# Patient Record
Sex: FEMALE | Race: BLACK OR AFRICAN AMERICAN | NOT HISPANIC OR LATINO | Employment: UNEMPLOYED | URBAN - METROPOLITAN AREA
[De-identification: names, ages, dates, MRNs, and addresses within clinical notes are randomized per-mention and may not be internally consistent; named-entity substitution may affect disease eponyms.]

---

## 2017-01-09 ENCOUNTER — TELEPHONE (OUTPATIENT)
Dept: UROLOGY | Facility: CLINIC | Age: 57
End: 2017-01-09

## 2017-01-09 NOTE — TELEPHONE ENCOUNTER
----- Message from Jenny Lujan sent at 1/9/2017 11:17 AM CST -----  Contact: Flaquita Lawrence# [phone]  Flaquita Chun states she was calling to speak to the nurse about a Code the pt need for her injection 1/13.

## 2017-01-10 ENCOUNTER — TELEPHONE (OUTPATIENT)
Dept: OBSTETRICS AND GYNECOLOGY | Facility: CLINIC | Age: 57
End: 2017-01-10

## 2017-01-10 NOTE — TELEPHONE ENCOUNTER
Spoke with patient and advised her to speak with someone in billing for prices. Also was told to inquire about code. Patient has no further questions or complaints.

## 2017-01-10 NOTE — TELEPHONE ENCOUNTER
----- Message from Ramona Levine sent at 1/10/2017 11:44 AM CST -----  Contact: Patient  X _1st Request  _  2nd Request  _  3rd Request    Who:Meaghan Call [[id number]]    Why:Patient states she needs the cpt code and price for the injection for the estrogen    What Number to Call Back:Patient can be reached at [phone]    When to Expect a call back: (Before the end of the day)   -- if call after 3:00 call back will be tomorrow.

## 2017-01-13 ENCOUNTER — OFFICE VISIT (OUTPATIENT)
Dept: OBSTETRICS AND GYNECOLOGY | Facility: CLINIC | Age: 57
End: 2017-01-13
Attending: OBSTETRICS & GYNECOLOGY
Payer: COMMERCIAL

## 2017-01-13 VITALS
DIASTOLIC BLOOD PRESSURE: 72 MMHG | HEIGHT: 61 IN | BODY MASS INDEX: 21.31 KG/M2 | SYSTOLIC BLOOD PRESSURE: 106 MMHG | WEIGHT: 112.88 LBS

## 2017-01-13 DIAGNOSIS — Z78.0 MENOPAUSE: Primary | ICD-10-CM

## 2017-01-13 PROCEDURE — 96372 THER/PROPH/DIAG INJ SC/IM: CPT | Mod: S$GLB,,, | Performed by: OBSTETRICS & GYNECOLOGY

## 2017-01-13 PROCEDURE — 99214 OFFICE O/P EST MOD 30 MIN: CPT | Mod: 25,S$GLB,, | Performed by: OBSTETRICS & GYNECOLOGY

## 2017-01-13 PROCEDURE — 3078F DIAST BP <80 MM HG: CPT | Mod: S$GLB,,, | Performed by: OBSTETRICS & GYNECOLOGY

## 2017-01-13 PROCEDURE — 3074F SYST BP LT 130 MM HG: CPT | Mod: S$GLB,,, | Performed by: OBSTETRICS & GYNECOLOGY

## 2017-01-13 PROCEDURE — 1159F MED LIST DOCD IN RCRD: CPT | Mod: S$GLB,,, | Performed by: OBSTETRICS & GYNECOLOGY

## 2017-01-13 PROCEDURE — 99999 PR PBB SHADOW E&M-EST. PATIENT-LVL II: CPT | Mod: PBBFAC,,, | Performed by: OBSTETRICS & GYNECOLOGY

## 2017-01-13 NOTE — PROGRESS NOTES
Subjective:       Patient ID: Meaghan Call is a 56 y.o. female.    Chief Complaint:  Menopause      History of Present Illness  HPI  This 56 yr old female with history of Hyst /BSO in June in [place] is here to follow up after starting on injections of estradiol 10 mg Im.  She was very upset last time I saw her about her symptoms of menopause.  She felt better once she received the injection almost immediately.  She is not ready to start on the testosterone. We discussed menopause again and will repeat injection  Follow up with labs in 2 weeks    GYN & OB History  No LMP recorded. Patient has had a hysterectomy.   Date of Last Pap: No result found    OB History   No data available       Review of Systems  Review of Systems        Objective:    Physical Exam       Assessment:        1. Menopause               Plan:      Repeat injection of E 10mg and do not use vagifem or pills.    Lab for estrogen in 2 weeks.

## 2017-01-27 ENCOUNTER — LAB VISIT (OUTPATIENT)
Dept: LAB | Facility: OTHER | Age: 57
End: 2017-01-27
Attending: OBSTETRICS & GYNECOLOGY
Payer: COMMERCIAL

## 2017-01-27 DIAGNOSIS — Z78.0 MENOPAUSE: ICD-10-CM

## 2017-01-27 LAB — ESTRADIOL SERPL-MCNC: 268 PG/ML

## 2017-01-27 PROCEDURE — 36415 COLL VENOUS BLD VENIPUNCTURE: CPT

## 2017-01-27 PROCEDURE — 82670 ASSAY OF TOTAL ESTRADIOL: CPT

## 2017-01-30 ENCOUNTER — PATIENT MESSAGE (OUTPATIENT)
Dept: OBSTETRICS AND GYNECOLOGY | Facility: CLINIC | Age: 57
End: 2017-01-30

## 2017-02-07 ENCOUNTER — PATIENT MESSAGE (OUTPATIENT)
Dept: OBSTETRICS AND GYNECOLOGY | Facility: CLINIC | Age: 57
End: 2017-02-07

## 2017-02-20 ENCOUNTER — CLINICAL SUPPORT (OUTPATIENT)
Dept: OBSTETRICS AND GYNECOLOGY | Facility: CLINIC | Age: 57
End: 2017-02-20
Payer: COMMERCIAL

## 2017-02-20 DIAGNOSIS — Z78.0 MENOPAUSE: Primary | ICD-10-CM

## 2017-02-20 PROCEDURE — 96372 THER/PROPH/DIAG INJ SC/IM: CPT | Mod: S$GLB,,, | Performed by: OBSTETRICS & GYNECOLOGY

## 2017-02-20 PROCEDURE — 99999 PR PBB SHADOW E&M-EST. PATIENT-LVL II: CPT | Mod: PBBFAC,,,

## 2017-02-20 NOTE — PROGRESS NOTES
Here for  hormone therapy injection, no complaints at this time , Injection given as ordered, tolerated well, no report of pain prior to or after injection. Return to clinic as scheduled.     Depo Estradiol  10 mg    Clinic Supplied Medication

## 2017-03-19 ENCOUNTER — PATIENT MESSAGE (OUTPATIENT)
Dept: OBSTETRICS AND GYNECOLOGY | Facility: CLINIC | Age: 57
End: 2017-03-19

## 2017-03-22 ENCOUNTER — TELEPHONE (OUTPATIENT)
Dept: OBSTETRICS AND GYNECOLOGY | Facility: CLINIC | Age: 57
End: 2017-03-22

## 2017-03-22 ENCOUNTER — CLINICAL SUPPORT (OUTPATIENT)
Dept: OBSTETRICS AND GYNECOLOGY | Facility: CLINIC | Age: 57
End: 2017-03-22
Payer: COMMERCIAL

## 2017-03-22 PROCEDURE — 99999 PR PBB SHADOW E&M-EST. PATIENT-LVL II: CPT | Mod: PBBFAC,,,

## 2017-03-22 PROCEDURE — 96372 THER/PROPH/DIAG INJ SC/IM: CPT | Mod: S$GLB,,, | Performed by: OBSTETRICS & GYNECOLOGY

## 2017-03-22 NOTE — PROGRESS NOTES
Here for  hormone therapy injection, no complaints at this time , Injection given as ordered, tolerated well, no report of pain prior to or after injection. Return to clinic as scheduled.     Site - [facility]    Depo Estradiol     10    mg    Clinic Supplied Medication

## 2017-03-22 NOTE — TELEPHONE ENCOUNTER
----- Message from Mercedes Eli MD sent at 3/21/2017  1:12 PM CDT -----  Sandra, please call this pt for appointment with me for hormone follow up.  She had appointment for her injection next week.

## 2017-03-30 ENCOUNTER — TELEPHONE (OUTPATIENT)
Dept: OBSTETRICS AND GYNECOLOGY | Facility: CLINIC | Age: 57
End: 2017-03-30

## 2017-03-30 NOTE — TELEPHONE ENCOUNTER
----- Message from Millie Rosenberg sent at 3/30/2017  9:28 AM CDT -----  Contact: self  Patient is wanting a diagnosis code for the pellets. Patient can be reached at [phone].

## 2017-03-30 NOTE — TELEPHONE ENCOUNTER
Patient was provided with CPT for pellet insertion and that the cost may vary depending on her insurance coverage,patient was informed that typically everyone insurance of verified prior to the appointment.

## 2017-03-30 NOTE — TELEPHONE ENCOUNTER
----- Message from Erwin Abernathy sent at 3/30/2017  9:10 AM CDT -----  Please call pt she needs the price on a in office procedure [phone]

## 2017-04-03 ENCOUNTER — TELEPHONE (OUTPATIENT)
Dept: OBSTETRICS AND GYNECOLOGY | Facility: CLINIC | Age: 57
End: 2017-04-03

## 2017-04-03 NOTE — TELEPHONE ENCOUNTER
----- Message from Sandra Santiago sent at 4/3/2017 10:28 AM CDT -----  Contact: pt  x_  1st Request  _  2nd Request  _  3rd Request      Who:pt    Why: pt would like to reschedule her appointment to April 19 if possible     What Number to Call Back: [phone]    When to Expect a call back: (Before the end of the day)   -- if call after 3:00 call back will be tomorrow.

## 2017-04-17 ENCOUNTER — TELEPHONE (OUTPATIENT)
Dept: OBSTETRICS AND GYNECOLOGY | Facility: CLINIC | Age: 57
End: 2017-04-17

## 2017-04-17 NOTE — TELEPHONE ENCOUNTER
----- Message from Maria D Villarreal sent at 4/17/2017 12:20 PM CDT -----  Contact: self  Pt needing a call back regarding an appt, pt can be reached at [phone].

## 2017-04-17 NOTE — TELEPHONE ENCOUNTER
Tried to schedule the patient and unable to select. Patient advised I would leave the message for review tomorrow

## 2017-04-21 ENCOUNTER — CLINICAL SUPPORT (OUTPATIENT)
Dept: OBSTETRICS AND GYNECOLOGY | Facility: CLINIC | Age: 57
End: 2017-04-21
Payer: COMMERCIAL

## 2017-04-21 ENCOUNTER — OFFICE VISIT (OUTPATIENT)
Dept: OBSTETRICS AND GYNECOLOGY | Facility: CLINIC | Age: 57
End: 2017-04-21
Attending: OBSTETRICS & GYNECOLOGY
Payer: COMMERCIAL

## 2017-04-21 VITALS
SYSTOLIC BLOOD PRESSURE: 112 MMHG | WEIGHT: 120.56 LBS | DIASTOLIC BLOOD PRESSURE: 84 MMHG | BODY MASS INDEX: 22.76 KG/M2 | HEIGHT: 61 IN

## 2017-04-21 DIAGNOSIS — Z79.890 POSTMENOPAUSAL HRT (HORMONE REPLACEMENT THERAPY): Primary | ICD-10-CM

## 2017-04-21 PROCEDURE — 99999 PR PBB SHADOW E&M-EST. PATIENT-LVL II: CPT | Mod: PBBFAC,,,

## 2017-04-21 PROCEDURE — 3074F SYST BP LT 130 MM HG: CPT | Mod: S$GLB,,, | Performed by: OBSTETRICS & GYNECOLOGY

## 2017-04-21 PROCEDURE — 3079F DIAST BP 80-89 MM HG: CPT | Mod: S$GLB,,, | Performed by: OBSTETRICS & GYNECOLOGY

## 2017-04-21 PROCEDURE — 99213 OFFICE O/P EST LOW 20 MIN: CPT | Mod: 25,S$GLB,, | Performed by: OBSTETRICS & GYNECOLOGY

## 2017-04-21 PROCEDURE — 99999 PR PBB SHADOW E&M-EST. PATIENT-LVL II: CPT | Mod: PBBFAC,,, | Performed by: OBSTETRICS & GYNECOLOGY

## 2017-04-21 PROCEDURE — 1160F RVW MEDS BY RX/DR IN RCRD: CPT | Mod: S$GLB,,, | Performed by: OBSTETRICS & GYNECOLOGY

## 2017-04-21 PROCEDURE — 96372 THER/PROPH/DIAG INJ SC/IM: CPT | Mod: S$GLB,,, | Performed by: OBSTETRICS & GYNECOLOGY

## 2017-04-21 RX ORDER — PROMETHAZINE HYDROCHLORIDE 25 MG/1
25 TABLET ORAL EVERY 6 HOURS PRN
Refills: 1 | COMMUNITY
Start: 2017-04-02 | End: 2017-10-09

## 2017-04-21 NOTE — PROGRESS NOTES
Subjective:       Patient ID: Meaghan Call is a 56 y.o. female.    Chief Complaint:  Follow-up      History of Present Illness  HPI  This 56 yr old female with history of hyst/BSO is finally doing well on monthly injections of 10 mg estradiol.  She states that a few days before each injection, she starts to feel badly again.  We discussed pellets and will send a letter to her insurance co for coverage.  She does not desire to take testosterone.  No other issues and she is due for her injection today.    GYN & OB History  No LMP recorded. Patient has had a hysterectomy.   Date of Last Pap: 3/14/2017    OB History   No data available       Review of Systems  Review of Systems   Constitutional: Negative for chills and fever.   Respiratory: Negative for shortness of breath.    Cardiovascular: Negative for chest pain.   Gastrointestinal: Negative for abdominal pain, nausea and vomiting.   Genitourinary: Negative for difficulty urinating, dyspareunia, genital sores, menstrual problem, pelvic pain, vaginal bleeding, vaginal discharge and vaginal pain.   Skin: Negative for wound.   Hematological: Negative for adenopathy.           Objective:    Physical Exam       Assessment:        1. Postmenopausal HRT (hormone replacement therapy)               Plan:      Continue estrogen Im 10 mg monthly and may try pellets if her insurance will help cover.

## 2017-04-21 NOTE — LETTER
April 21, 2017    Meaghan Clal  [address]  [place] [address]             [facility] [address]  [address] [facility] [address]  Phone: [phone] To whom it may concern,    To whom it may concern,    Ms Meaghan Call is a patient of mine and she has menopausal symptoms that affect her daily life.  She has tried numerous treatments with no relief and is finally doing well with the insertion of hormone long term pellets.  She has tried estrace, vivelle, climara  with no relief.  This problem affects her sleep, level of energy, mood, libido and relationships with family and friends and also her work.   She is doing very well on the hormone pellets and this is not an optional medication for her.  Please review her case and cover this necessary medication for my patient.  The code for this procedure is 64263  Thank you for your time and attention to this matter.          Mercedes lEi MD  Gynecologist  [facility]  [address]  [address]      If you have any questions or concerns, please don't hesitate to call.    Sincerely,        Mercedes Eli MD

## 2017-04-21 NOTE — PROGRESS NOTES
Here for  hormone therapy injection, no complaints at this time , Injection given as ordered, tolerated well, no report of pain prior to or after injection. Return to clinic as scheduled.     Site - [facility]    Depo Estradiol  10 mg    Clinic Supplied Medication

## 2017-05-18 ENCOUNTER — CLINICAL SUPPORT (OUTPATIENT)
Dept: OBSTETRICS AND GYNECOLOGY | Facility: CLINIC | Age: 57
End: 2017-05-18
Payer: COMMERCIAL

## 2017-05-18 DIAGNOSIS — Z79.890 POSTMENOPAUSAL HRT (HORMONE REPLACEMENT THERAPY): Primary | ICD-10-CM

## 2017-05-18 PROCEDURE — 99999 PR PBB SHADOW E&M-EST. PATIENT-LVL II: CPT | Mod: PBBFAC,,,

## 2017-05-18 PROCEDURE — 96372 THER/PROPH/DIAG INJ SC/IM: CPT | Mod: S$GLB,,, | Performed by: OBSTETRICS & GYNECOLOGY

## 2017-05-22 ENCOUNTER — PATIENT MESSAGE (OUTPATIENT)
Dept: OBSTETRICS AND GYNECOLOGY | Facility: CLINIC | Age: 57
End: 2017-05-22

## 2017-06-09 ENCOUNTER — OFFICE VISIT (OUTPATIENT)
Dept: UROLOGY | Facility: CLINIC | Age: 57
End: 2017-06-09
Payer: COMMERCIAL

## 2017-06-09 VITALS
HEART RATE: 86 BPM | DIASTOLIC BLOOD PRESSURE: 85 MMHG | BODY MASS INDEX: 23.06 KG/M2 | WEIGHT: 122.13 LBS | HEIGHT: 61 IN | SYSTOLIC BLOOD PRESSURE: 137 MMHG

## 2017-06-09 DIAGNOSIS — G89.29 CHRONIC BILATERAL LOW BACK PAIN WITHOUT SCIATICA: ICD-10-CM

## 2017-06-09 DIAGNOSIS — M54.50 CHRONIC BILATERAL LOW BACK PAIN WITHOUT SCIATICA: ICD-10-CM

## 2017-06-09 DIAGNOSIS — N30.10 INTERSTITIAL CYSTITIS: Primary | ICD-10-CM

## 2017-06-09 PROCEDURE — 51700 IRRIGATION OF BLADDER: CPT | Mod: S$GLB,,, | Performed by: UROLOGY

## 2017-06-09 PROCEDURE — 99999 PR PBB SHADOW E&M-EST. PATIENT-LVL III: CPT | Mod: PBBFAC,,, | Performed by: UROLOGY

## 2017-06-09 PROCEDURE — 99214 OFFICE O/P EST MOD 30 MIN: CPT | Mod: 25,S$GLB,, | Performed by: UROLOGY

## 2017-06-09 RX ORDER — LIDOCAINE HYDROCHLORIDE 10 MG/ML
20 INJECTION INFILTRATION; PERINEURAL
Status: COMPLETED | OUTPATIENT
Start: 2017-06-09 | End: 2017-06-09

## 2017-06-09 RX ORDER — BUPIVACAINE HYDROCHLORIDE 5 MG/ML
20 INJECTION, SOLUTION EPIDURAL; INTRACAUDAL
Status: COMPLETED | OUTPATIENT
Start: 2017-06-09 | End: 2017-06-09

## 2017-06-09 RX ADMIN — BUPIVACAINE HYDROCHLORIDE 100 MG: 5 INJECTION, SOLUTION EPIDURAL; INTRACAUDAL at 10:06

## 2017-06-09 RX ADMIN — LIDOCAINE HYDROCHLORIDE 20 ML: 10 INJECTION INFILTRATION; PERINEURAL at 10:06

## 2017-06-09 NOTE — PROGRESS NOTES
CHIEF COMPLAINT:    Mrs. Call is a 56 y.o. female presenting for a follow up on interstitial cystitis    PRESENTING ILLNESS:    Meaghan Call is a 56 y.o. female  Who returns for follow up.  She states for the past several weeks, she has had lower back pain.  She does not move very much secondary to fibromyalgia.  She states she also has pain that radiates towards the front suprapubically.  She is unsure if this is deep in the pelvis or if it is at the level of the bladder.   Review of the chart showed that she has done really well with the hormone therapy prescribed by Dr. Eli.    REVIEW OF SYSTEMS:    Review of Systems   Constitutional: Negative.    HENT: Negative.    Eyes: Negative.    Respiratory: Negative.    Cardiovascular: Negative.    Gastrointestinal: Positive for abdominal pain.   Genitourinary: Negative.    Musculoskeletal: Positive for myalgias.   Skin: Negative.    Neurological: Negative.    Endo/Heme/Allergies: Negative.    Psychiatric/Behavioral: Negative.      PATIENT HISTORY:    Past Medical History:   Diagnosis Date    Acid reflux     Gastroparesis     Hypertension     Interstitial cystitis 2004    Interstitial cystitis     Kidney stone     Osteopenia        Past Surgical History:   Procedure Laterality Date    BREAST BIOPSY Left     BENIGN    CHOLECYSTECTOMY      CYSTOSCOPY      HYSTERECTOMY      MUSCLE BIPOSY Right     Thigh area    OOPHORECTOMY      TUBAL LIGATION         No family history on file.    Social History    Marital status:      Social History Main Topics    Smoking status: Never Smoker    Smokeless tobacco: Not on file    Alcohol use No    Drug use: No    Sexual activity: Yes       Allergies:  Codeine    Medications:  Outpatient Encounter Prescriptions as of 6/9/2017   Medication Sig Dispense Refill    estradiol (ESTRACE) 2 MG tablet Take 2 mg by mouth once daily.  6    famotidine (PEPCID) 40 MG tablet Take 40 mg by mouth once daily.       propranolol (INDERAL) 10 MG tablet Take 10 mg by mouth 3 (three) times daily.      URO--10-40.8-36 mg Cap       VAGIFEM 10 mcg Tab Place 10 mg vaginally.  2    promethazine (PHENERGAN) 25 MG tablet Take 25 mg by mouth every 6 (six) hours as needed.  1    [DISCONTINUED] alprazolam (XANAX) 0.25 MG tablet       [DISCONTINUED] fluconazole (DIFLUCAN) 100 MG tablet Take 100 mg by mouth every 7 days.      [DISCONTINUED] OSPHENA 60 mg Tab Take 1 tablet by mouth once daily.  5     Facility-Administered Encounter Medications as of 6/9/2017   Medication Dose Route Frequency Provider Last Rate Last Dose    bupivacaine (PF) 0.5% (5 mg/ml) injection 100 mg  20 mL Intravesical 1 time in Clinic/HOD Ebonie Booker MD        estradiol cypionate 5 mg/mL injection 10 mg  10 mg Intramuscular Q28 Days Mercedes Eli MD   10 mg at 05/18/17 1005    hydrocortisone sodium succinate injection 100 mg  100 mg Intravesical 1 time in Clinic/HOD Ebonie Booker MD        lidocaine HCL 10 mg/ml (1%) injection 20 mL  20 mL Intravesical 1 time in Clinic/HOD Ebonie Booker MD             PHYSICAL EXAMINATION:    The patient generally appears in good health, is appropriately interactive, and is in no apparent distress.    Skin: No lesions.    Mental: Cooperative with normal affect.    Neuro: Grossly intact.    HEENT: Normal. No evidence of lymphadenopathy.    Chest:  normal inspiratory effort.    Abdomen:  Soft, non-tender. No masses or organomegaly. Bladder is not palpable. No evidence of flank discomfort. No evidence of inguinal hernia.    Extremities: No clubbing, cyanosis, or edema    Normal external female genitalia  Urethral meatus is normal  Urethra and bladder are nontender to bimanual exam  Well supported anteriorly and posteriorly   Uterus and cervix are surgically absent  No adnexal masses  PVR by catheterization was 30 ml    Back:  She indicates the lumbar spine as the area of pain.      LABS:    UA 1.015,  pH 6, tr blood, otherwise, negative    IMPRESSION:    Encounter Diagnoses   Name Primary?    Interstitial cystitis Yes    Chronic bilateral low back pain without sciatica        PLAN:    1.  Discussed that he lower back amy is likely secondary to chronic lower back pain.    2.  The bladder was instilled with 1% lidocaine and 0.25% Marcaine and solu cortef.  She felt relief of her symptoms of the lower abdominal pain.    3.  Suggested that she do yoga or pilates for the back pain to support core strength.  Will also help with her myalgias.   4.  Follow up as needed.

## 2017-06-14 ENCOUNTER — CLINICAL SUPPORT (OUTPATIENT)
Dept: OBSTETRICS AND GYNECOLOGY | Facility: CLINIC | Age: 57
End: 2017-06-14
Payer: COMMERCIAL

## 2017-06-14 PROCEDURE — 96372 THER/PROPH/DIAG INJ SC/IM: CPT | Mod: S$GLB,,, | Performed by: OBSTETRICS & GYNECOLOGY

## 2017-06-14 PROCEDURE — 99999 PR PBB SHADOW E&M-EST. PATIENT-LVL II: CPT | Mod: PBBFAC,,,

## 2017-07-12 ENCOUNTER — CLINICAL SUPPORT (OUTPATIENT)
Dept: OBSTETRICS AND GYNECOLOGY | Facility: CLINIC | Age: 57
End: 2017-07-12
Payer: COMMERCIAL

## 2017-07-12 DIAGNOSIS — Z79.890 HORMONE REPLACEMENT THERAPY (HRT): Primary | ICD-10-CM

## 2017-07-12 PROCEDURE — 96372 THER/PROPH/DIAG INJ SC/IM: CPT | Mod: S$GLB,,, | Performed by: OBSTETRICS & GYNECOLOGY

## 2017-07-12 PROCEDURE — 99999 PR PBB SHADOW E&M-EST. PATIENT-LVL II: CPT | Mod: PBBFAC,,,

## 2017-07-18 DIAGNOSIS — N95.1 SYMPTOMATIC MENOPAUSAL OR FEMALE CLIMACTERIC STATES: Primary | ICD-10-CM

## 2017-07-18 NOTE — PATIENT INSTRUCTIONS
Follow the information you have been given by your doctor. Here are some additional instructions and reminders on how to care for yourself once you're home.     Keep pressure on the area for 1 hour. You may have some discomfort. This will go away.   A slight bloody discharge is normal.   A slight yellow discharge of serum is normal.   Pellet can be felt below the skin cut. This is normal.   Breast and vulva tingling and feeling of fullness is normal.   Change your bandage daily for 5 to 7 days.   If small butterfly tape comes off, you do not need to do anything.   You may remove butterfly tape after 5 days.   You may shower the next morning after your procedure. Do NOT take a bath.   Change your bandage if it becomes wet.   If one small white pellet comes out, just wipe off with alcohol and throw away.   If area becomes tender and inflamed, take the antibiotic medication you have been given.   If the suture looks like it has melted, schedule an appointment to have it removed.   Do not do exercise or stretch for 3 days.   Some fluid retention can occur in first weeks or later.   In about 1 month, this area will feel like a lump. This is normal.    If you have any questions, contact the [facility] at [phone].    OCTAVIO Eli MD  [facility]  [address] [facility], [address]  Phone: [phone]  Fax: [phone]

## 2017-08-11 ENCOUNTER — CLINICAL SUPPORT (OUTPATIENT)
Dept: OBSTETRICS AND GYNECOLOGY | Facility: CLINIC | Age: 57
End: 2017-08-11
Payer: COMMERCIAL

## 2017-08-11 DIAGNOSIS — Z79.890 HORMONE REPLACEMENT THERAPY (HRT): Primary | ICD-10-CM

## 2017-08-11 PROCEDURE — 99999 PR PBB SHADOW E&M-EST. PATIENT-LVL II: CPT | Mod: PBBFAC,,,

## 2017-08-11 PROCEDURE — 96372 THER/PROPH/DIAG INJ SC/IM: CPT | Mod: S$GLB,,, | Performed by: OBSTETRICS & GYNECOLOGY

## 2017-09-08 ENCOUNTER — CLINICAL SUPPORT (OUTPATIENT)
Dept: OBSTETRICS AND GYNECOLOGY | Facility: CLINIC | Age: 57
End: 2017-09-08
Payer: COMMERCIAL

## 2017-09-08 PROCEDURE — 96372 THER/PROPH/DIAG INJ SC/IM: CPT | Mod: S$GLB,,, | Performed by: OBSTETRICS & GYNECOLOGY

## 2017-09-08 PROCEDURE — 99999 PR PBB SHADOW E&M-EST. PATIENT-LVL II: CPT | Mod: PBBFAC,,,

## 2017-10-09 ENCOUNTER — OFFICE VISIT (OUTPATIENT)
Dept: UROLOGY | Facility: CLINIC | Age: 57
End: 2017-10-09
Payer: COMMERCIAL

## 2017-10-09 ENCOUNTER — CLINICAL SUPPORT (OUTPATIENT)
Dept: OBSTETRICS AND GYNECOLOGY | Facility: CLINIC | Age: 57
End: 2017-10-09
Payer: COMMERCIAL

## 2017-10-09 ENCOUNTER — TELEPHONE (OUTPATIENT)
Dept: UROLOGY | Facility: CLINIC | Age: 57
End: 2017-10-09

## 2017-10-09 VITALS
HEIGHT: 61 IN | WEIGHT: 121.5 LBS | SYSTOLIC BLOOD PRESSURE: 139 MMHG | BODY MASS INDEX: 22.94 KG/M2 | HEART RATE: 86 BPM | DIASTOLIC BLOOD PRESSURE: 76 MMHG

## 2017-10-09 DIAGNOSIS — R31.29 MICROSCOPIC HEMATURIA: ICD-10-CM

## 2017-10-09 DIAGNOSIS — N39.3 URINARY, INCONTINENCE, STRESS FEMALE: Primary | ICD-10-CM

## 2017-10-09 DIAGNOSIS — R31.29 MICROSCOPIC HEMATURIA: Primary | ICD-10-CM

## 2017-10-09 DIAGNOSIS — N30.10 INTERSTITIAL CYSTITIS: ICD-10-CM

## 2017-10-09 PROCEDURE — 99999 PR PBB SHADOW E&M-EST. PATIENT-LVL II: CPT | Mod: PBBFAC,,,

## 2017-10-09 PROCEDURE — 99214 OFFICE O/P EST MOD 30 MIN: CPT | Mod: 25,S$GLB,, | Performed by: UROLOGY

## 2017-10-09 PROCEDURE — 81002 URINALYSIS NONAUTO W/O SCOPE: CPT | Mod: S$GLB,,, | Performed by: UROLOGY

## 2017-10-09 PROCEDURE — 96372 THER/PROPH/DIAG INJ SC/IM: CPT | Mod: S$GLB,,, | Performed by: OBSTETRICS & GYNECOLOGY

## 2017-10-09 PROCEDURE — 99999 PR PBB SHADOW E&M-EST. PATIENT-LVL IV: CPT | Mod: PBBFAC,,, | Performed by: UROLOGY

## 2017-10-09 RX ORDER — SUCRALFATE 1 G/1
TABLET ORAL
COMMUNITY
Start: 2017-08-05 | End: 2018-12-10

## 2017-10-09 NOTE — PATIENT INSTRUCTIONS
Ramirez Meza physical therapist  [facility], [address]    Phone [phone]

## 2017-10-09 NOTE — PROGRESS NOTES
CHIEF COMPLAINT:    Mrs. Call is a 57 y.o. female presenting for an evaluation of stress incontinence in a patient with history of interstitial cystitis.    PRESENTING ILLNESS:    Meaghan Call is a 57 y.o. female who returns for evaluation of stress urinary incontinence.  She states that she has stress urinary incontinence, (sneeze, cough, laughing.)  She now wears a panty liner when she goes out.  She will urinate more often, just in case.  She denies any urge incontinence.  Once she noted some dripping when walking away from the toilet.      REVIEW OF SYSTEMS:    Review of Systems   Constitutional: Negative.    HENT: Negative.    Eyes: Negative.    Respiratory: Negative.    Cardiovascular: Negative.    Gastrointestinal: Negative.    Genitourinary:        Stress incontinence   Musculoskeletal: Positive for myalgias.   Skin: Negative.    Neurological: Negative.    Endo/Heme/Allergies: Negative.    Psychiatric/Behavioral: Negative.      PATIENT HISTORY:    Past Medical History:   Diagnosis Date    Acid reflux     Gastroparesis     Hypertension     Interstitial cystitis 2004    Interstitial cystitis     Kidney stone     Osteopenia        Past Surgical History:   Procedure Laterality Date    BREAST BIOPSY Left     BENIGN    CHOLECYSTECTOMY      CYSTOSCOPY      HYSTERECTOMY      MUSCLE BIPOSY Right     Thigh area    OOPHORECTOMY      TUBAL LIGATION         No family history on file.    Social History    Marital status:      Social History Main Topics    Smoking status: Never Smoker    Smokeless tobacco: Never Used    Alcohol use No    Drug use: No    Sexual activity: Yes       Allergies:  Codeine    Medications:  Outpatient Encounter Prescriptions as of 10/9/2017   Medication Sig Dispense Refill    estradiol (ESTRACE) 2 MG tablet Take 2 mg by mouth once daily.  6    famotidine (PEPCID) 40 MG tablet Take 40 mg by mouth once daily.      propranolol (INDERAL) 10 MG tablet Take  10 mg by mouth 3 (three) times daily.      sucralfate (CARAFATE) 1 gram tablet       URO--10-40.8-36 mg Cap       VAGIFEM 10 mcg Tab Place 10 mg vaginally.  2    [DISCONTINUED] promethazine (PHENERGAN) 25 MG tablet Take 25 mg by mouth every 6 (six) hours as needed.  1     Facility-Administered Encounter Medications as of 10/9/2017   Medication Dose Route Frequency Provider Last Rate Last Dose    estradiol cypionate 5 mg/mL injection 10 mg  10 mg Intramuscular Q28 Days Mercedes Eli MD   10 mg at 10/09/17 1000         PHYSICAL EXAMINATION:    The patient generally appears in good health, is appropriately interactive, and is in no apparent distress.    Skin: No lesions.    Mental: Cooperative with normal affect.    Neuro: Grossly intact.    HEENT: Normal. No evidence of lymphadenopathy.    Chest:  normal inspiratory effort.    Abdomen:  Soft, non-tender. No masses or organomegaly. Bladder is not palpable. No evidence of flank discomfort. No evidence of inguinal hernia.    Extremities: No clubbing, cyanosis, or edema    Normal external female genitalia  Urethral meatus is normal  Urethra and bladder are nontender to bimanual exam  Grade II lateral cystocele  Well supported posteriorly   Uterus and cervix are surgically absent  No adnexal masses  A formal Q tip test was not done (as she has a history of IC) but appears to be 60 degrees estimated    LABS:    UA 1.015, pH 5, 250 blood, otherwise, negative      IMPRESSION:    Encounter Diagnoses   Name Primary?    Urinary, incontinence, stress female Yes    Interstitial cystitis     Microscopic hematuria        PLAN:    1.  Renal ultrasound and cystoscopy under sedation.  Consented for the cystoscopy, with possible bladder biopsy and fulguration if a lesion is seen.  2.  Referred to physical therapy in [place] for the stress incontinence, given the description of symptoms.

## 2017-10-09 NOTE — LETTER
October 9, 2017    Meaghan Call  [address]  [place] [address]             [facility] - U[facility] 4th Floor  [address]  [facility] [address]  Phone: [phone] Dear Ramirez    Mrs. Meaghan Call has stress incontinence (ICD 10 N39.3)with some hypermobility on exam.  Her symptoms are mild and she would benefit from physical therapy.  She also has a history of interstitial cystitis (ICD 10 30.10) which is quiescent at the time of this letter.      Please evaluate and treat her for the stress incontinence.     If you have any questions or concerns, please don't hesitate to call.    Sincerely,        Ebonie Booker MD

## 2017-10-18 ENCOUNTER — ANESTHESIA EVENT (OUTPATIENT)
Dept: SURGERY | Facility: HOSPITAL | Age: 57
End: 2017-10-18
Payer: COMMERCIAL

## 2017-10-18 NOTE — PRE ADMISSION SCREENING
Anesthesia Assessment: Preoperative EQUATION    Planned Procedure: Procedure(s) (LRB):  CYSTOSCOPY W/BLADDER BIOPSY,POSSIBLE FULGURATION-BLADDER (N/A)  Requested Anesthesia Type:Monitor Anesthesia Care  Surgeon: Ebonie Booker MD  Service: Urology  Known or anticipated Date of Surgery:10/31/2017    Surgeon notes: reviewed    Electronic QUestionnaire Assessment completed via nurse interview with patient.        NO AQ    Triage considerations:     The patient has no apparent active cardiac condition (No unstable coronary Syndrome such as severe unstable angina or recent [<1 month] myocardial infarction, decompensated CHF, severe valvular   disease or significant arrhythmia)    Previous anesthesia records:GETA, MAC, Not available and Complications noted-HX  PONV x1    Last PCP note: within 1 month , outside [facility]  Dr Boyer  Subspecialty notes: n/a    Other important co-morbidities: HTN, GERD     Tests already available:  to be determined-pt states she had labs done with PCP last month            Instructions given. (See in Nurse's note)    Optimization:  Anesthesia Preop Clinic Assessment  Indicated-not required for this procedure    Medical Opinion Indicated-will ask PCP to fax last visit note/labs/EKG             Plan:    Testing:  BMP and EKG     Patient  has previously scheduled Medical Appointment: none    Navigation: Tests Scheduled. TBD-will order if not obtained from PCP             Consults scheduled.             Results will be tracked by Preop Clinic.

## 2017-10-18 NOTE — ANESTHESIA PREPROCEDURE EVALUATION
Pre Admission Screening  Janna Patricia RN      []Hide copied text  []Hover for attribution information  Anesthesia Assessment: Preoperative EQUATION     Planned Procedure: Procedure(s) (LRB):  CYSTOSCOPY W/BLADDER BIOPSY,POSSIBLE FULGURATION-BLADDER (N/A)  Requested Anesthesia Type:Monitor Anesthesia Care  Surgeon: Ebonie Booker MD  Service: Urology  Known or anticipated Date of Surgery:10/31/2017     Surgeon notes: reviewed     Electronic QUestionnaire Assessment completed via nurse interview with patient.         NO AQ     Triage considerations:      The patient has no apparent active cardiac condition (No unstable coronary Syndrome such as severe unstable angina or recent [<1 month] myocardial infarction, decompensated CHF, severe valvular   disease or significant arrhythmia)     Previous anesthesia records:GETA, MAC, Not available and Complications noted-HX  PONV x1     Last PCP note: within 1 month , outside [facility] Dr Dantin  Subspecialty notes: n/a     Other important co-morbidities: HTN, GERD     Tests already available:  to be determined-pt states she had labs done with PCP last month                            Instructions given. (See in Nurse's note)     Optimization:  Anesthesia Preop Clinic Assessment  Indicated-not required for this procedure    Medical Opinion Indicated-will ask PCP to fax last visit note/labs/EKG                                            Plan:    Testing:  BMP and EKG                           Patient  has previously scheduled Medical Appointment: none     Navigation: Tests Scheduled. TBD-will order if not obtained from PCP                        Consults scheduled.                        Results will be tracked by Preop Clinic.                                Electronically signed by Janna Patricia RN at 10/18/2017 10:39 AM        Pre-admit on 10/31/2017            Detailed Report        10/23/17 -OS visit note, 9/1 CBC, CMP and A1c obtained and scanned to media. Will order  EKG am of surgery.                                                                                                                10/18/2017  Meaghan Call is a 57 y.o., female.    Anesthesia Evaluation    I have reviewed the Patient Summary Reports.    I have reviewed the Nursing Notes.      Review of Systems  Anesthesia Hx:  No problems with previous Anesthesia  Denies Family Hx of Anesthesia complications.  Personal Hx of Anesthesia complications, Post-Operative Nausea/Vomiting   Hematology/Oncology:  Hematology Normal   Oncology Normal   Hematology Comments: HX of anemia while pregnant. Denies anemia at this time. Currently taking po iron daily.    EENT/Dental:EENT/Dental Normal   Cardiovascular:   Hypertension  Denies Angina.  Functional Capacity 4 METS  Hypertension , Well Controlled on Rx    Pulmonary:  Pulmonary Normal  Denies Shortness of breath.  Denies Recent URI.    Renal/:   Chronic Renal Disease  Renal Symptoms/Infections/Stones: incontinence, hematuria.  Other Renal / Gu Conditions: Interstitial Cystitis  Hepatic/GI:   GERD  Esophageal / Stomach Disorders Gerd, Gastric Conditions:, Gastroparesis Controlled by chronic antireflux medication.    Musculoskeletal:  Musculoskeletal Normal    Neurological:  Neurology Normal    Endocrine:  Endocrine Normal    Dermatological:  Skin Normal    Psych:  Psychiatric Normal           Physical Exam  General:  Well nourished    Airway/Jaw/Neck:  Airway Findings: Mouth Opening: Normal Tongue: Normal  General Airway Assessment: Adult  Mallampati: I  TM Distance: Normal, at least 6 cm        Eyes/Ears/Nose:  EYES/EARS/NOSE FINDINGS: Normal   Dental:  Dental Findings: In tact   Chest/Lungs:  Chest/Lungs Clear    Heart/Vascular:  Heart Findings: Normal Heart murmur: negative Vascular Findings: Normal    Abdomen:  Abdomen Findings: Normal    Musculoskeletal:  Musculoskeletal Findings: Normal   Skin:  Skin Findings: Normal    Mental Status:  Mental Status  Findings: Normal        Anesthesia Plan  Type of Anesthesia, risks & benefits discussed:  Anesthesia Type:  general, MAC  Patient's Preference:   Intra-op Monitoring Plan:   Intra-op Monitoring Plan Comments:   Post Op Pain Control Plan:   Post Op Pain Control Plan Comments:   Induction:   IV  Beta Blocker:  Patient is on a Beta-Blocker and has received one dose within the past 24 hours (No further documentation required).       Informed Consent: Patient understands risks and agrees with Anesthesia plan.  Questions answered. Anesthesia consent signed with patient.  ASA Score: 2     Day of Surgery Review of History & Physical:    H&P update referred to the surgeon.         Ready For Surgery From Anesthesia Perspective.

## 2017-10-23 DIAGNOSIS — Z01.818 PREOPERATIVE TESTING: Primary | ICD-10-CM

## 2017-10-30 ENCOUNTER — TELEPHONE (OUTPATIENT)
Dept: UROLOGY | Facility: CLINIC | Age: 57
End: 2017-10-30

## 2017-10-30 NOTE — TELEPHONE ENCOUNTER
Called pt to confirm 10am arrival time for procedure. Gave pt NPO instructions and gave pt opportunity to ask questions. Pt verbalized understanding.

## 2017-10-31 ENCOUNTER — ANESTHESIA (OUTPATIENT)
Dept: SURGERY | Facility: HOSPITAL | Age: 57
End: 2017-10-31
Payer: COMMERCIAL

## 2017-10-31 ENCOUNTER — HOSPITAL ENCOUNTER (OUTPATIENT)
Dept: RADIOLOGY | Facility: HOSPITAL | Age: 57
Discharge: HOME | End: 2017-10-31
Attending: UROLOGY
Payer: COMMERCIAL

## 2017-10-31 ENCOUNTER — HOSPITAL ENCOUNTER (OUTPATIENT)
Facility: HOSPITAL | Age: 57
Discharge: HOME | End: 2017-10-31
Attending: UROLOGY | Admitting: UROLOGY
Payer: COMMERCIAL

## 2017-10-31 DIAGNOSIS — R31.29 MICROSCOPIC HEMATURIA: ICD-10-CM

## 2017-10-31 DIAGNOSIS — Z01.818 PREOPERATIVE TESTING: ICD-10-CM

## 2017-10-31 DIAGNOSIS — N39.3 STRESS INCONTINENCE: ICD-10-CM

## 2017-10-31 DIAGNOSIS — N30.10 INTERSTITIAL CYSTITIS: Primary | ICD-10-CM

## 2017-10-31 PROCEDURE — 93010 ELECTROCARDIOGRAM REPORT: CPT | Mod: ,,, | Performed by: INTERNAL MEDICINE

## 2017-10-31 PROCEDURE — 71000033 HC RECOVERY, INTIAL HOUR: Performed by: UROLOGY

## 2017-10-31 PROCEDURE — 52000 CYSTOURETHROSCOPY: CPT | Mod: ,,, | Performed by: UROLOGY

## 2017-10-31 PROCEDURE — 37000008 HC ANESTHESIA 1ST 15 MINUTES: Performed by: UROLOGY

## 2017-10-31 PROCEDURE — 63600175 PHARM REV CODE 636 W HCPCS: Performed by: NURSE ANESTHETIST, CERTIFIED REGISTERED

## 2017-10-31 PROCEDURE — 63600175 PHARM REV CODE 636 W HCPCS: Performed by: STUDENT IN AN ORGANIZED HEALTH CARE EDUCATION/TRAINING PROGRAM

## 2017-10-31 PROCEDURE — 76770 US EXAM ABDO BACK WALL COMP: CPT | Mod: TC

## 2017-10-31 PROCEDURE — 71000015 HC POSTOP RECOV 1ST HR: Performed by: UROLOGY

## 2017-10-31 PROCEDURE — D9220A PRA ANESTHESIA: Mod: ANES,,, | Performed by: ANESTHESIOLOGY

## 2017-10-31 PROCEDURE — 37000009 HC ANESTHESIA EA ADD 15 MINS: Performed by: UROLOGY

## 2017-10-31 PROCEDURE — 76770 US EXAM ABDO BACK WALL COMP: CPT | Mod: 26,,, | Performed by: RADIOLOGY

## 2017-10-31 PROCEDURE — 93005 ELECTROCARDIOGRAM TRACING: CPT

## 2017-10-31 PROCEDURE — D9220A PRA ANESTHESIA: Mod: CRNA,,, | Performed by: NURSE ANESTHETIST, CERTIFIED REGISTERED

## 2017-10-31 PROCEDURE — 25000003 PHARM REV CODE 250: Performed by: NURSE ANESTHETIST, CERTIFIED REGISTERED

## 2017-10-31 PROCEDURE — 36000706: Performed by: UROLOGY

## 2017-10-31 PROCEDURE — 63600175 PHARM REV CODE 636 W HCPCS

## 2017-10-31 PROCEDURE — 36000707: Performed by: UROLOGY

## 2017-10-31 PROCEDURE — 25000003 PHARM REV CODE 250: Performed by: UROLOGY

## 2017-10-31 RX ORDER — FENTANYL CITRATE 50 UG/ML
INJECTION, SOLUTION INTRAMUSCULAR; INTRAVENOUS
Status: DISCONTINUED | OUTPATIENT
Start: 2017-10-31 | End: 2017-10-31

## 2017-10-31 RX ORDER — PROMETHAZINE HYDROCHLORIDE 25 MG/ML
INJECTION, SOLUTION INTRAMUSCULAR; INTRAVENOUS
Status: COMPLETED
Start: 2017-10-31 | End: 2017-10-31

## 2017-10-31 RX ORDER — LIDOCAINE HYDROCHLORIDE 20 MG/ML
JELLY TOPICAL
Status: DISCONTINUED | OUTPATIENT
Start: 2017-10-31 | End: 2017-10-31 | Stop reason: HOSPADM

## 2017-10-31 RX ORDER — SODIUM CHLORIDE 9 MG/ML
INJECTION, SOLUTION INTRAVENOUS CONTINUOUS
Status: DISCONTINUED | OUTPATIENT
Start: 2017-10-31 | End: 2017-10-31 | Stop reason: HOSPADM

## 2017-10-31 RX ORDER — KETAMINE HYDROCHLORIDE 100 MG/ML
INJECTION, SOLUTION INTRAMUSCULAR; INTRAVENOUS
Status: DISCONTINUED | OUTPATIENT
Start: 2017-10-31 | End: 2017-10-31

## 2017-10-31 RX ORDER — PROMETHAZINE HYDROCHLORIDE 25 MG/ML
6.25 INJECTION, SOLUTION INTRAMUSCULAR; INTRAVENOUS ONCE
Status: COMPLETED | OUTPATIENT
Start: 2017-10-31 | End: 2017-10-31

## 2017-10-31 RX ORDER — ACETAMINOPHEN 325 MG/1
650 TABLET ORAL EVERY 6 HOURS PRN
Status: DISCONTINUED | OUTPATIENT
Start: 2017-10-31 | End: 2017-10-31 | Stop reason: HOSPADM

## 2017-10-31 RX ORDER — MIDAZOLAM HYDROCHLORIDE 1 MG/ML
INJECTION, SOLUTION INTRAMUSCULAR; INTRAVENOUS
Status: DISCONTINUED | OUTPATIENT
Start: 2017-10-31 | End: 2017-10-31

## 2017-10-31 RX ORDER — PROPOFOL 10 MG/ML
VIAL (ML) INTRAVENOUS
Status: DISCONTINUED | OUTPATIENT
Start: 2017-10-31 | End: 2017-10-31

## 2017-10-31 RX ORDER — PROPOFOL 10 MG/ML
VIAL (ML) INTRAVENOUS CONTINUOUS PRN
Status: DISCONTINUED | OUTPATIENT
Start: 2017-10-31 | End: 2017-10-31

## 2017-10-31 RX ORDER — LIDOCAINE HCL/PF 100 MG/5ML
SYRINGE (ML) INTRAVENOUS
Status: DISCONTINUED | OUTPATIENT
Start: 2017-10-31 | End: 2017-10-31

## 2017-10-31 RX ORDER — LIDOCAINE HYDROCHLORIDE 10 MG/ML
1 INJECTION, SOLUTION EPIDURAL; INFILTRATION; INTRACAUDAL; PERINEURAL ONCE
Status: COMPLETED | OUTPATIENT
Start: 2017-10-31 | End: 2017-10-31

## 2017-10-31 RX ORDER — CEFAZOLIN SODIUM 2 G/50ML
2 SOLUTION INTRAVENOUS
Status: COMPLETED | OUTPATIENT
Start: 2017-10-31 | End: 2017-10-31

## 2017-10-31 RX ORDER — ONDANSETRON 2 MG/ML
INJECTION INTRAMUSCULAR; INTRAVENOUS
Status: DISCONTINUED | OUTPATIENT
Start: 2017-10-31 | End: 2017-10-31

## 2017-10-31 RX ADMIN — FENTANYL CITRATE 50 MCG: 50 INJECTION, SOLUTION INTRAMUSCULAR; INTRAVENOUS at 02:10

## 2017-10-31 RX ADMIN — PROPOFOL 40 MG: 10 INJECTION, EMULSION INTRAVENOUS at 02:10

## 2017-10-31 RX ADMIN — ONDANSETRON 4 MG: 2 INJECTION INTRAMUSCULAR; INTRAVENOUS at 02:10

## 2017-10-31 RX ADMIN — PROMETHAZINE HYDROCHLORIDE 6.25 MG: 25 INJECTION, SOLUTION INTRAMUSCULAR; INTRAVENOUS at 03:10

## 2017-10-31 RX ADMIN — PROMETHAZINE HYDROCHLORIDE 6.25 MG: 25 INJECTION INTRAMUSCULAR; INTRAVENOUS at 03:10

## 2017-10-31 RX ADMIN — SODIUM CHLORIDE: 0.9 INJECTION, SOLUTION INTRAVENOUS at 10:10

## 2017-10-31 RX ADMIN — PROPOFOL 50 MCG/KG/MIN: 10 INJECTION, EMULSION INTRAVENOUS at 02:10

## 2017-10-31 RX ADMIN — MIDAZOLAM HYDROCHLORIDE 2 MG: 1 INJECTION, SOLUTION INTRAMUSCULAR; INTRAVENOUS at 02:10

## 2017-10-31 RX ADMIN — CEFAZOLIN SODIUM 2 G: 2 SOLUTION INTRAVENOUS at 02:10

## 2017-10-31 RX ADMIN — KETAMINE HYDROCHLORIDE 25 MG: 100 INJECTION, SOLUTION, CONCENTRATE INTRAMUSCULAR; INTRAVENOUS at 02:10

## 2017-10-31 RX ADMIN — LIDOCAINE HYDROCHLORIDE 2 MG: 10 INJECTION, SOLUTION EPIDURAL; INFILTRATION; INTRACAUDAL; PERINEURAL at 10:10

## 2017-10-31 RX ADMIN — LIDOCAINE HYDROCHLORIDE 50 MG: 20 INJECTION, SOLUTION INTRAVENOUS at 02:10

## 2017-10-31 NOTE — TRANSFER OF CARE
"Anesthesia Transfer of Care Note    Patient: Meaghan Call    Procedure(s) Performed: Procedure(s) (LRB):  CYSTOSCOPY (N/A)    Patient location: PACU    Anesthesia Type: general    Transport from OR: Transported from OR on room air with adequate spontaneous ventilation    Post pain: adequate analgesia    Post assessment: no apparent anesthetic complications and tolerated procedure well    Post vital signs: stable    Level of consciousness: awake, oriented and alert    Nausea/Vomiting: no nausea/vomiting    Complications: none    Transfer of care protocol was followed      Last vitals:   Visit Vitals  BP (!) 179/95   Pulse 60   Temp 36.7 °C (98.1 °F)   Resp 18   Ht 5' 1" (1.549 m)   Wt 54.9 kg (121 lb)   SpO2 100%   Breastfeeding? No   BMI 22.86 kg/m²     "

## 2017-10-31 NOTE — PROGRESS NOTES
Dr Villalobos called to get update on status of pt surgery time; stated Dr Lunsford will come talk to pt.

## 2017-10-31 NOTE — DISCHARGE INSTRUCTIONS
Cystoscopy    Cystoscopy is a procedure that lets your doctor look directly inside your urethra and bladder. It can be used to:  · Help diagnose a problem with your urethra, bladder, or kidneys.  · Take a sample (biopsy) of bladder or urethral tissue.  · Treat certain problems (such as removing kidney stones).  · Place a stent to bypass an obstruction.  · Take special X-rays of the kidneys.  Based on the findings, your doctor may recommend other tests or treatments.  What is a cystoscope?  A cystoscope is a telescope-like instrument that contains lenses and fiberoptics (small glass wires that make bright light). The cystoscope may be straight and rigid, or flexible to bend around curves in the urethra. The doctor may look directly into the cystoscope, or project the image onto a monitor.  Getting ready  · Ask your doctor if you should stop taking any medicines before the procedure.  · Ask whether you should avoid eating or drinking anything after midnight before the procedure.  · Follow any other instructions your doctor gives you.  Tell your doctor before the exam if you:  · Take any medicines, such as aspirin or blood thinners  · Have allergies to any medicines  · Are pregnant   The procedure  Cystoscopy is done in the doctors office, surgery center, or hospital. The doctor and a nurse are present during the procedure. It takes only a few minutes, longer if a biopsy, X-ray, or treatment needs to be done.  During the procedure:  · You lie on an exam table on your back, knees bent and legs apart. You are covered with a drape.  · Your urethra and the area around it are washed. Anesthetic jelly may be applied to numb the urethra. Other pain medicine is usually not needed. In some cases, you may be offered a mild sedative to help you relax. If a more extensive procedure is to be done, such as a biopsy or kidney stone removal, general anesthesia may be needed.  · The cystoscope is inserted. A sterile fluid is put  into the bladder to expand it. You may feel pressure from this fluid.  · When the procedure is done, the cystoscope is removed.  After the procedure  If you had a sedative, general anesthesia, or spinal anesthesia, you must have someone drive you home. Once youre home:  · Drink plenty of fluids.  · You may have burning or light bleeding when you urinate--this is normal.  · Medicines may be prescribed to ease any discomfort or prevent infection. Take these as directed.  · Call your doctor if you have heavy bleeding or blood clots, burning that lasts more than a day, a fever over 100°F  (38° C), or trouble urinating.  Date Last Reviewed: 1/1/2017  © 4775-7127 [employer]. [address] [place] [address], [place] [address]. All rights reserved. This information is not intended as a substitute for professional medical care. Always follow your healthcare professional's instructions.

## 2017-10-31 NOTE — H&P (VIEW-ONLY)
CHIEF COMPLAINT:    Mrs. Call is a 57 y.o. female presenting for an evaluation of stress incontinence in a patient with history of interstitial cystitis.    PRESENTING ILLNESS:    Meaghan Call is a 57 y.o. female who returns for evaluation of stress urinary incontinence.  She states that she has stress urinary incontinence, (sneeze, cough, laughing.)  She now wears a panty liner when she goes out.  She will urinate more often, just in case.  She denies any urge incontinence.  Once she noted some dripping when walking away from the toilet.      REVIEW OF SYSTEMS:    Review of Systems   Constitutional: Negative.    HENT: Negative.    Eyes: Negative.    Respiratory: Negative.    Cardiovascular: Negative.    Gastrointestinal: Negative.    Genitourinary:        Stress incontinence   Musculoskeletal: Positive for myalgias.   Skin: Negative.    Neurological: Negative.    Endo/Heme/Allergies: Negative.    Psychiatric/Behavioral: Negative.      PATIENT HISTORY:    Past Medical History:   Diagnosis Date    Acid reflux     Gastroparesis     Hypertension     Interstitial cystitis 2004    Interstitial cystitis     Kidney stone     Osteopenia        Past Surgical History:   Procedure Laterality Date    BREAST BIOPSY Left     BENIGN    CHOLECYSTECTOMY      CYSTOSCOPY      HYSTERECTOMY      MUSCLE BIPOSY Right     Thigh area    OOPHORECTOMY      TUBAL LIGATION         No family history on file.    Social History    Marital status:      Social History Main Topics    Smoking status: Never Smoker    Smokeless tobacco: Never Used    Alcohol use No    Drug use: No    Sexual activity: Yes       Allergies:  Codeine    Medications:  Outpatient Encounter Prescriptions as of 10/9/2017   Medication Sig Dispense Refill    estradiol (ESTRACE) 2 MG tablet Take 2 mg by mouth once daily.  6    famotidine (PEPCID) 40 MG tablet Take 40 mg by mouth once daily.      propranolol (INDERAL) 10 MG tablet Take  10 mg by mouth 3 (three) times daily.      sucralfate (CARAFATE) 1 gram tablet       URO--10-40.8-36 mg Cap       VAGIFEM 10 mcg Tab Place 10 mg vaginally.  2    [DISCONTINUED] promethazine (PHENERGAN) 25 MG tablet Take 25 mg by mouth every 6 (six) hours as needed.  1     Facility-Administered Encounter Medications as of 10/9/2017   Medication Dose Route Frequency Provider Last Rate Last Dose    estradiol cypionate 5 mg/mL injection 10 mg  10 mg Intramuscular Q28 Days Mercedes Eli MD   10 mg at 10/09/17 1000         PHYSICAL EXAMINATION:    The patient generally appears in good health, is appropriately interactive, and is in no apparent distress.    Skin: No lesions.    Mental: Cooperative with normal affect.    Neuro: Grossly intact.    HEENT: Normal. No evidence of lymphadenopathy.    Chest:  normal inspiratory effort.    Abdomen:  Soft, non-tender. No masses or organomegaly. Bladder is not palpable. No evidence of flank discomfort. No evidence of inguinal hernia.    Extremities: No clubbing, cyanosis, or edema    Normal external female genitalia  Urethral meatus is normal  Urethra and bladder are nontender to bimanual exam  Grade II lateral cystocele  Well supported posteriorly   Uterus and cervix are surgically absent  No adnexal masses  A formal Q tip test was not done (as she has a history of IC) but appears to be 60 degrees estimated    LABS:    UA 1.015, pH 5, 250 blood, otherwise, negative      IMPRESSION:    Encounter Diagnoses   Name Primary?    Urinary, incontinence, stress female Yes    Interstitial cystitis     Microscopic hematuria        PLAN:    1.  Renal ultrasound and cystoscopy under sedation.  Consented for the cystoscopy, with possible bladder biopsy and fulguration if a lesion is seen.  2.  Referred to physical therapy in [place] for the stress incontinence, given the description of symptoms.

## 2017-10-31 NOTE — PLAN OF CARE
Discharge instructions given and explained to patient and family with verbalization of understanding all instructions. Patients v/s stable, denies n/v and tolerating po, rates pain level tolerable, IV removed, and family at bedside for patient discharge home.

## 2017-10-31 NOTE — ANESTHESIA RELEASE NOTE
Post Anesthetic Evaluation    Patient: Meaghan Call    Procedure(s) Performed: Procedure(s) (LRB):  CYSTOSCOPY (N/A)    Anesthesia type: GA    Patient location: PACU    Post pain: Adequate analgesia    Post assessment: no apparent anesthetic complications    Last Vitals:   Vitals:    10/31/17 1515   BP: 122/66   Pulse: 61   Resp: 18   Temp:        Post vital signs: stable    Level of consciousness: awake    Complications: none    Follow-up Needed: No

## 2017-10-31 NOTE — PROGRESS NOTES
Pt and family updated: cysto nurse called desk and stated Dr Booker had an emergency upstairs; do not know when pt is going to procedure now. Will update if any other info given to me. Pt and family verbalized understanding; stated she was hungry; reinforced: NPO.

## 2017-10-31 NOTE — OP NOTE
[facility]  Operative Note    Date: 10/31/2017    Pre-Op Diagnosis: stress urinary incontinence, interstitial cystitis    Patient Active Problem List   Diagnosis    Hypertension    Chronic bilateral low back pain without sciatica    Interstitial cystitis    Stress incontinence         Post-Op Diagnosis: same    Procedure(s) Performed:   1.  Cystoscopy     Specimen(s): none    Staff Surgeon: Ebonie Booker MD    Assistant Surgeon: Jose Rafael Villalobos MD    Anesthesia: Monitored Local Anesthesia with Sedation    Indications: Meaghan Call is a 57 y.o. female with stress urinary incontinence and interstitial cystitis.     Findings:   1. Cystoscopy normal. No masses or mucosal changes. Normal urethra.    Estimated Blood Loss: min    Drains: none    Procedure in Detail:  After risks, benefits and possible complications of cystoscopy were explained, the patient elected to undergo the procedure and informed consent was obtained. All questions were answered in the dominique-operative area. The patient was transferred to the cystoscopy suite and placed in the supine position.  SCDs were applied and working.  Anesthesia was administered.  Once the patient was adequately sedated, she was placed in the dorsal lithotomy position and prepped and draped in the usual sterile fashion. Time out was performed, dominique-procedural antibiotics were confirmed.     A rigid cystoscope in a 22 Fr sheath was introduced into the bladder per urethra. This passed easily.  The entire urethra was visualized which showed no masses or strictures.  The right and left ureteral orifices were identified in the normal anatomic position and were seen effluxing clear urine.  Formal cystoscopy was performed which revealed no masses or lesions suspicious for malignancy, no trabeculations, no bladder stones and no bladder diverticuli. The bladder mucosa appeared healthy. The bladder was drained, and the patient was removed from lithotomy.      The patient tolerated the procedure well and was transferred to recovery in stable condition.    Disposition:  The patient will follow up with Dr. Booker in 1 weeks.     MD JANINE Norris was present for the entire case and agree with the above note.

## 2017-10-31 NOTE — DISCHARGE SUMMARY
[facility]  Discharge Note  Short Stay    Admit Date: 10/31/2017    Discharge Date and Time: 10/31/2017 3:14 PM    Attending Physician: Ebonie Booker MD     Discharge Provider: Jose Rafael Villalobos MD    Diagnoses:  Active Hospital Problems    Diagnosis  POA    *Stress incontinence [N39.3]  Yes      Resolved Hospital Problems    Diagnosis Date Resolved POA   No resolved problems to display.       Discharged Condition: good    Hospital Course: Patient was admitted for an outpatient cystoscopy and tolerated the procedure well with no complications.    Final Diagnoses: Same as principal problem.    Disposition: Home or Self Care    Follow up/Patient Instructions:    Medications:  Reconciled Home Medications:   Current Discharge Medication List      CONTINUE these medications which have NOT CHANGED    Details   CALCIUM CARBONATE (CALCIUM 500 ORAL) Take by mouth.      estradiol (ESTRACE) 2 MG tablet Take 2 mg by mouth once daily.  Refills: 6      famotidine (PEPCID) 40 MG tablet Take 40 mg by mouth once daily.      IRON, FERROUS SULFATE, ORAL Take by mouth.      propranolol (INDERAL) 10 MG tablet Take 10 mg by mouth every morning.       sucralfate (CARAFATE) 1 gram tablet       URO--10-40.8-36 mg Cap       VAGIFEM 10 mcg Tab Place 10 mg vaginally.  Refills: 2             Discharge Procedure Orders  Diet general     Activity as tolerated     Call MD for:  persistent nausea and vomiting or diarrhea     Call MD for:  severe uncontrolled pain     Call MD for:   Order Comments: Temperature > 101F       Follow-up Information     Ebonie Booker MD In 1 week.    Specialty:  Urology  Contact information:  [address] [place] [id number]  [phone]                 As above.

## 2017-10-31 NOTE — ANESTHESIA POSTPROCEDURE EVALUATION
"Anesthesia Post Evaluation    Patient: Meaghan Call    Procedure(s) Performed: Procedure(s) (LRB):  CYSTOSCOPY (N/A)    Final Anesthesia Type: general  Patient location during evaluation: PACU  Patient participation: Yes- Able to Participate  Level of consciousness: awake and alert  Post-procedure vital signs: reviewed and stable  Pain management: adequate  Airway patency: patent  PONV status at discharge: No PONV  Anesthetic complications: no      Cardiovascular status: blood pressure returned to baseline  Respiratory status: spontaneous ventilation and room air  Hydration status: euvolemic  Follow-up not needed.        Visit Vitals  /66   Pulse 61   Temp 36.8 °C (98.2 °F) (Temporal)   Resp 18   Ht 5' 1" (1.549 m)   Wt 54.9 kg (121 lb)   SpO2 100%   Breastfeeding? No   BMI 22.86 kg/m²       Pain/Dilcia Score: Pain Assessment Performed: Yes (10/31/2017  3:00 PM)  Presence of Pain: denies (10/31/2017  3:00 PM)  Dilcia Score: 10 (10/31/2017  3:00 PM)      "

## 2017-11-01 VITALS
SYSTOLIC BLOOD PRESSURE: 136 MMHG | RESPIRATION RATE: 18 BRPM | WEIGHT: 121 LBS | HEIGHT: 61 IN | TEMPERATURE: 98 F | DIASTOLIC BLOOD PRESSURE: 55 MMHG | BODY MASS INDEX: 22.84 KG/M2 | HEART RATE: 60 BPM | OXYGEN SATURATION: 100 %

## 2017-11-01 NOTE — PROGRESS NOTES
Patient was called today for post op follow up after cysto procedure. She complains of having nausea and vomiting until 11pm last night, but better today. However complains of back pain on both sides, pain and burning to urinate, blood in urine which is getting lighter in color as time goes on. Denies fever.Encouraged  po fluids and instructed to call Dr Booker's office if not better tomorrow. Verbalizes understanding.

## 2017-11-02 ENCOUNTER — PATIENT MESSAGE (OUTPATIENT)
Dept: UROLOGY | Facility: CLINIC | Age: 57
End: 2017-11-02

## 2017-11-03 ENCOUNTER — TELEMEDICINE (OUTPATIENT)
Dept: UROLOGY | Facility: CLINIC | Age: 57
End: 2017-11-03

## 2017-11-03 ENCOUNTER — TELEPHONE (OUTPATIENT)
Dept: UROLOGY | Facility: CLINIC | Age: 57
End: 2017-11-03

## 2017-11-03 ENCOUNTER — OFFICE VISIT (OUTPATIENT)
Dept: UROLOGY | Facility: CLINIC | Age: 57
End: 2017-11-03
Payer: COMMERCIAL

## 2017-11-03 VITALS
WEIGHT: 121 LBS | SYSTOLIC BLOOD PRESSURE: 161 MMHG | DIASTOLIC BLOOD PRESSURE: 89 MMHG | BODY MASS INDEX: 22.84 KG/M2 | HEIGHT: 61 IN | HEART RATE: 79 BPM

## 2017-11-03 DIAGNOSIS — N30.10 INTERSTITIAL CYSTITIS: Primary | ICD-10-CM

## 2017-11-03 PROCEDURE — 51700 IRRIGATION OF BLADDER: CPT | Mod: S$GLB,,, | Performed by: UROLOGY

## 2017-11-03 PROCEDURE — 99213 OFFICE O/P EST LOW 20 MIN: CPT | Mod: 25,S$GLB,, | Performed by: UROLOGY

## 2017-11-03 PROCEDURE — 99999 PR PBB SHADOW E&M-EST. PATIENT-LVL III: CPT | Mod: PBBFAC,,, | Performed by: UROLOGY

## 2017-11-03 RX ORDER — LIDOCAINE HYDROCHLORIDE 10 MG/ML
20 INJECTION INFILTRATION; PERINEURAL
Status: COMPLETED | OUTPATIENT
Start: 2017-11-03 | End: 2017-11-03

## 2017-11-03 RX ORDER — BUPIVACAINE HYDROCHLORIDE 5 MG/ML
20 INJECTION, SOLUTION EPIDURAL; INTRACAUDAL
Status: COMPLETED | OUTPATIENT
Start: 2017-11-03 | End: 2017-11-03

## 2017-11-03 RX ORDER — HEPARIN SODIUM 10000 [USP'U]/ML
10000 INJECTION, SOLUTION INTRAVENOUS; SUBCUTANEOUS
Status: COMPLETED | OUTPATIENT
Start: 2017-11-03 | End: 2017-11-03

## 2017-11-03 RX ORDER — INDOMETHACIN 25 MG/1
50 CAPSULE ORAL
Status: COMPLETED | OUTPATIENT
Start: 2017-11-03 | End: 2017-11-03

## 2017-11-03 RX ADMIN — BUPIVACAINE HYDROCHLORIDE 100 MG: 5 INJECTION, SOLUTION EPIDURAL; INTRACAUDAL at 11:11

## 2017-11-03 RX ADMIN — LIDOCAINE HYDROCHLORIDE 20 ML: 10 INJECTION INFILTRATION; PERINEURAL at 11:11

## 2017-11-03 RX ADMIN — INDOMETHACIN 50 MEQ: 25 CAPSULE ORAL at 11:11

## 2017-11-03 RX ADMIN — HEPARIN SODIUM 10000 UNITS: 10000 INJECTION, SOLUTION INTRAVENOUS; SUBCUTANEOUS at 11:11

## 2017-11-03 NOTE — TELEPHONE ENCOUNTER
----- Message from Haley Huddleston sent at 11/3/2017  8:04 AM CDT -----  Contact: self - [phone]  mikki - had cysto on Tuesday - has bladder pain - [facility]  -please call patient at

## 2017-11-03 NOTE — PROGRESS NOTES
"CHIEF COMPLAINT:    Mrs. Call is a 57 y.o. female presenting for an IC flare    PRESENTING ILLNESS:    Meaghan Call is a 57 y.o. female who is status post cystoscopy under sedation on 10/31/2017 for microscopic hematuria.  It turns out that she also had a flare that has been ongoing for the past 2 weeks.  She felt like the pain "moves around" but that this may be bladder spasms.  She has pelvic pain that worsened after the cystoscopy.  (No biopsy was performed, just a cystoscopy and Lidocaine 2% was placed after the cystoscopy was completed.     REVIEW OF SYSTEMS:    Review of Systems   Constitutional: Negative.    HENT: Negative.    Eyes: Negative.    Respiratory: Negative.    Cardiovascular: Negative.    Gastrointestinal: Negative.    Genitourinary: Positive for dysuria and urgency.   Musculoskeletal: Positive for myalgias.   Skin: Negative.    Neurological: Negative.    Endo/Heme/Allergies: Negative.      PATIENT HISTORY:    Past Medical History:   Diagnosis Date    Acid reflux     Gastroparesis     Hypertension     Interstitial cystitis 2004    Interstitial cystitis     Kidney stone     Osteopenia        Past Surgical History:   Procedure Laterality Date    BREAST BIOPSY Left     BENIGN    CHOLECYSTECTOMY      CYSTOSCOPY      HYSTERECTOMY      MUSCLE BIPOSY Right     Thigh area    OOPHORECTOMY      TUBAL LIGATION         No family history on file.    Social History    Marital status:      Social History Main Topics    Smoking status: Never Smoker    Smokeless tobacco: Never Used    Alcohol use No    Drug use: No    Sexual activity: Yes     Allergies:  Codeine    Medications:  Outpatient Encounter Prescriptions as of 11/3/2017   Medication Sig Dispense Refill    CALCIUM CARBONATE (CALCIUM 500 ORAL) Take by mouth.      estradiol (ESTRACE) 2 MG tablet Take 2 mg by mouth once daily.  6    famotidine (PEPCID) 40 MG tablet Take 40 mg by mouth once daily.      IRON, " FERROUS SULFATE, ORAL Take by mouth.      propranolol (INDERAL) 10 MG tablet Take 10 mg by mouth every morning.       sucralfate (CARAFATE) 1 gram tablet       [DISCONTINUED] VAGIFEM 10 mcg Tab Place 10 mg vaginally.  2    [DISCONTINUED] URO--10-40.8-36 mg Cap        Facility-Administered Encounter Medications as of 11/3/2017   Medication Dose Route Frequency Provider Last Rate Last Dose    bupivacaine (PF) 0.5% (5 mg/ml) injection 100 mg  20 mL Intravesical 1 time in Clinic/HOD Ebonie Booker MD        dimethyl sulfoxide 50 % solution 50 mL  50 mL Intravesical 1 time in Clinic/ANGELES Booker MD        estradiol cypionate 5 mg/mL injection 10 mg  10 mg Intramuscular Q28 Days Mercedes Eli MD   10 mg at 10/09/17 1000    heparin (porcine) injection 10,000 Units  10,000 Units Intravesical 1 time in Clinic/ANGELES Booker MD        hydrocortisone sodium succinate injection 100 mg  100 mg Intramuscular 1 time in Clinic/ANGELES Booker MD        lidocaine HCL 10 mg/ml (1%) injection 20 mL  20 mL Intravesical 1 time in Clinic/ANGELES Booker MD        sodium bicarbonate solution 50 mEq  50 mEq Intravesical 1 time in Clinic/HOD Ebonie Booker MD             PHYSICAL EXAMINATION:    The patient generally appears in good health, is appropriately interactive, and is in no apparent distress.    Skin: No lesions.    Mental: Cooperative with normal affect.    Neuro: Grossly intact.    HEENT: Normal. No evidence of lymphadenopathy.    Chest:  normal inspiratory effort.    Abdomen:  Soft, non-tender. No masses or organomegaly. Bladder is not palpable. No evidence of flank discomfort. No evidence of inguinal hernia.    Extremities: No clubbing, cyanosis, or edema      LABS:    No results found for: BUN, CREATININE    IMPRESSION:    Encounter Diagnoses   Name Primary?    Interstitial cystitis Yes       PLAN:    1.  Bladder cocktail (DMSO, Na Bicarb, Lidocaine, Marcaine,  Solucortef, Heparin) were instilled into the bladder after prep with Betadine, 14 Fr catheter, placed under gravity drainage  2.  She was left to allow it to work for 15 minutes.  She states she felt better after it was done.   3.  She will let me know how she does and if she needs another instillation.

## 2017-11-06 ENCOUNTER — TELEPHONE (OUTPATIENT)
Dept: UROLOGY | Facility: CLINIC | Age: 57
End: 2017-11-06

## 2017-11-06 DIAGNOSIS — N28.1 COMPLEX RENAL CYST: Primary | ICD-10-CM

## 2017-11-07 NOTE — TELEPHONE ENCOUNTER
Patient faxed the results of previous renal ultrasounds (done in Dr. Geller's office) but no mention of the size of the cyst.  There was also a non contrast CT scan dated Oct 14, 2015 which mentioned a probable simple right renal cyst but no mention of size.      Ordered BMP and CT scan abdomen, no oral contrast, with renal mass protocol.

## 2017-11-07 NOTE — TELEPHONE ENCOUNTER
----- Message from Roxana Cheney LPN sent at 11/3/2017  4:15 PM CDT -----  Contact: Pt:-[phone]      ----- Message -----  From: Sheryl Gamboa  Sent: 11/3/2017   3:55 PM  To: [facility] Staff    Pt called and states she would like to speak with 's nurse. Pt states she came in for an instillation and the pt states the pain has come back. Pt states she would like to know if she could have something for pain.

## 2017-11-07 NOTE — TELEPHONE ENCOUNTER
Called the patient and she states she is better, taking motrin and using a heating pad, still has discomfort but it is not as bad as it was previousl.  She states she faxed the results of the previous ultrasounds but I have not had a chance to go through the faxes from today.  She will let me know if she needs another instillation this week.

## 2017-11-08 ENCOUNTER — TELEPHONE (OUTPATIENT)
Dept: UROLOGY | Facility: CLINIC | Age: 57
End: 2017-11-08

## 2017-11-08 ENCOUNTER — PATIENT MESSAGE (OUTPATIENT)
Dept: UROLOGY | Facility: CLINIC | Age: 57
End: 2017-11-08

## 2017-11-08 ENCOUNTER — HOSPITAL ENCOUNTER (OUTPATIENT)
Dept: RADIOLOGY | Facility: HOSPITAL | Age: 57
Discharge: HOME | End: 2017-11-08
Attending: UROLOGY
Payer: COMMERCIAL

## 2017-11-08 DIAGNOSIS — B37.31 VAGINAL YEAST INFECTION: Primary | ICD-10-CM

## 2017-11-08 DIAGNOSIS — N28.1 COMPLEX RENAL CYST: ICD-10-CM

## 2017-11-08 PROCEDURE — 74170 CT ABD WO CNTRST FLWD CNTRST: CPT | Mod: 26,,, | Performed by: RADIOLOGY

## 2017-11-08 PROCEDURE — 74170 CT ABD WO CNTRST FLWD CNTRST: CPT | Mod: TC

## 2017-11-08 PROCEDURE — 25500020 PHARM REV CODE 255: Performed by: UROLOGY

## 2017-11-08 RX ORDER — FLUCONAZOLE 150 MG/1
150 TABLET ORAL ONCE
Qty: 2 TABLET | Refills: 0 | Status: SHIPPED | OUTPATIENT
Start: 2017-11-08 | End: 2017-11-08

## 2017-11-08 RX ADMIN — IOHEXOL 75 ML: 350 INJECTION, SOLUTION INTRAVENOUS at 02:11

## 2017-11-08 NOTE — TELEPHONE ENCOUNTER
"----- Message from Roxana Cheney LPN sent at 11/8/2017  8:20 AM CST -----  Contact: pt [phone]  Pt states she developed a yeast infection "after all the probing", would like to know if you can send diflucan to her [facility] pharmacy.  ----- Message -----  From: Phoebe Boles  Sent: 11/8/2017   8:10 AM  To: [facility] Staff    Pt is asking to speak with Roxana in reference to a medication being called in for her on yesterday. Pt would like to know if Dr Booker will send medication to her rx. Please call pt    "

## 2017-11-09 ENCOUNTER — TELEPHONE (OUTPATIENT)
Dept: UROLOGY | Facility: CLINIC | Age: 57
End: 2017-11-09

## 2017-11-09 ENCOUNTER — CLINICAL SUPPORT (OUTPATIENT)
Dept: OBSTETRICS AND GYNECOLOGY | Facility: CLINIC | Age: 57
End: 2017-11-09
Payer: COMMERCIAL

## 2017-11-09 DIAGNOSIS — Z79.890 HORMONE REPLACEMENT THERAPY (HRT): Primary | ICD-10-CM

## 2017-11-09 PROCEDURE — 96372 THER/PROPH/DIAG INJ SC/IM: CPT | Mod: S$GLB,,, | Performed by: OBSTETRICS & GYNECOLOGY

## 2017-11-09 NOTE — PROGRESS NOTES
Delestrogen injection given to pt in Left glut at 9:26 am.  Pt has no complaints of pain before or after injection.  Instructed pt to stay in facility for 10 minutes and report any reactions.  Pt verbalized understanding.          Delestrogen (10  Mg)    Lot: [id number]   Exp: 08/2021  Manu: [email or web address]

## 2017-11-09 NOTE — TELEPHONE ENCOUNTER
Called the patient and discussed the results of the CT scan.  There was no enhancement so this is a benign cyst.  Results have been released to her [facility] account.  She also states that the bladder pain is much better.

## 2017-11-10 ENCOUNTER — PATIENT MESSAGE (OUTPATIENT)
Dept: UROLOGY | Facility: CLINIC | Age: 57
End: 2017-11-10

## 2017-11-12 ENCOUNTER — PATIENT MESSAGE (OUTPATIENT)
Dept: UROLOGY | Facility: CLINIC | Age: 57
End: 2017-11-12

## 2017-11-12 DIAGNOSIS — N30.10 INTERSTITIAL CYSTITIS: Primary | ICD-10-CM

## 2017-12-02 ENCOUNTER — PATIENT MESSAGE (OUTPATIENT)
Dept: OBSTETRICS AND GYNECOLOGY | Facility: CLINIC | Age: 57
End: 2017-12-02

## 2017-12-11 ENCOUNTER — CLINICAL SUPPORT (OUTPATIENT)
Dept: OBSTETRICS AND GYNECOLOGY | Facility: CLINIC | Age: 57
End: 2017-12-11
Payer: COMMERCIAL

## 2017-12-11 DIAGNOSIS — Z79.890 HORMONE REPLACEMENT THERAPY (HRT): Primary | ICD-10-CM

## 2017-12-11 PROCEDURE — 99999 PR PBB SHADOW E&M-EST. PATIENT-LVL II: CPT | Mod: PBBFAC,,,

## 2017-12-11 PROCEDURE — 96372 THER/PROPH/DIAG INJ SC/IM: CPT | Mod: S$GLB,,, | Performed by: OBSTETRICS & GYNECOLOGY

## 2017-12-21 ENCOUNTER — TELEPHONE (OUTPATIENT)
Dept: OBSTETRICS AND GYNECOLOGY | Facility: CLINIC | Age: 57
End: 2017-12-21

## 2017-12-21 ENCOUNTER — LAB VISIT (OUTPATIENT)
Dept: LAB | Facility: HOSPITAL | Age: 57
End: 2017-12-21
Attending: OBSTETRICS & GYNECOLOGY
Payer: COMMERCIAL

## 2017-12-21 DIAGNOSIS — N95.1 MENOPAUSAL SYMPTOMS: ICD-10-CM

## 2017-12-21 DIAGNOSIS — R53.83 FATIGUE, UNSPECIFIED TYPE: ICD-10-CM

## 2017-12-21 DIAGNOSIS — N95.1 MENOPAUSAL SYMPTOMS: Primary | ICD-10-CM

## 2017-12-21 PROCEDURE — 84144 ASSAY OF PROGESTERONE: CPT

## 2017-12-21 PROCEDURE — 82670 ASSAY OF TOTAL ESTRADIOL: CPT

## 2017-12-21 PROCEDURE — 84402 ASSAY OF FREE TESTOSTERONE: CPT

## 2017-12-21 PROCEDURE — 36415 COLL VENOUS BLD VENIPUNCTURE: CPT

## 2017-12-21 NOTE — TELEPHONE ENCOUNTER
Spoke with patient whom complained of breast pain and wondered if it was caused for estrogen injection. Patient was told that is a normal side effect and to take ibruprofen and follow up with the office in a week if pain continues. Patient denies discharge or redness. Patient has no further questions or complaints.

## 2017-12-21 NOTE — TELEPHONE ENCOUNTER
----- Message from Millie Rosenberg sent at 12/21/2017  8:26 AM CST -----  Contact: self  Patient is requesting a call back to discuss some breast pain she is having. Patient can be reached at [phone].

## 2017-12-22 LAB
ESTRADIOL SERPL-MCNC: 367 PG/ML
PROGEST SERPL-MCNC: 0.2 NG/ML

## 2017-12-23 ENCOUNTER — PATIENT MESSAGE (OUTPATIENT)
Dept: OBSTETRICS AND GYNECOLOGY | Facility: CLINIC | Age: 57
End: 2017-12-23

## 2017-12-26 LAB — TESTOST FREE SERPL-MCNC: 0.5 PG/ML

## 2018-01-11 ENCOUNTER — CLINICAL SUPPORT (OUTPATIENT)
Dept: OBSTETRICS AND GYNECOLOGY | Facility: CLINIC | Age: 58
End: 2018-01-11
Payer: COMMERCIAL

## 2018-01-11 DIAGNOSIS — N95.1 MENOPAUSAL SYMPTOMS: Primary | ICD-10-CM

## 2018-01-11 PROCEDURE — 96372 THER/PROPH/DIAG INJ SC/IM: CPT | Mod: S$GLB,,, | Performed by: OBSTETRICS & GYNECOLOGY

## 2018-01-11 NOTE — PROGRESS NOTES
Delestrogen injection given to pt in Left glut at 8:59 am.  Pt has no complaints of pain before or after injection.  Instructed pt to stay in facility for 10 minutes and report any reactions.  Pt verbalized understanding.        Delestrogen (10 Mg)    Lot: [id number]   Exp: 01/2022  Man[employer]: [employer]

## 2018-02-12 ENCOUNTER — CLINICAL SUPPORT (OUTPATIENT)
Dept: OBSTETRICS AND GYNECOLOGY | Facility: CLINIC | Age: 58
End: 2018-02-12
Payer: COMMERCIAL

## 2018-02-12 PROCEDURE — 96372 THER/PROPH/DIAG INJ SC/IM: CPT | Mod: S$GLB,,, | Performed by: OBSTETRICS & GYNECOLOGY

## 2018-02-12 PROCEDURE — 99999 PR PBB SHADOW E&M-EST. PATIENT-LVL II: CPT | Mod: PBBFAC,,,

## 2018-03-09 ENCOUNTER — TELEPHONE (OUTPATIENT)
Dept: UROLOGY | Facility: CLINIC | Age: 58
End: 2018-03-09

## 2018-03-09 NOTE — TELEPHONE ENCOUNTER
----- Message from Maria D Stratton MA sent at 3/9/2018  3:54 PM CST -----  Contact: [phone]  Pt has bladder pain and asking for medication to treat it. She said she is interested in the same mediation that her daughter takes for IC but she was not sure of the medication name.

## 2018-03-12 ENCOUNTER — CLINICAL SUPPORT (OUTPATIENT)
Dept: OBSTETRICS AND GYNECOLOGY | Facility: CLINIC | Age: 58
End: 2018-03-12
Payer: COMMERCIAL

## 2018-03-12 PROCEDURE — 96372 THER/PROPH/DIAG INJ SC/IM: CPT | Mod: S$GLB,,, | Performed by: OBSTETRICS & GYNECOLOGY

## 2018-03-12 PROCEDURE — 99999 PR PBB SHADOW E&M-EST. PATIENT-LVL II: CPT | Mod: PBBFAC,,,

## 2018-05-01 ENCOUNTER — PATIENT MESSAGE (OUTPATIENT)
Dept: UROLOGY | Facility: CLINIC | Age: 58
End: 2018-05-01

## 2018-05-09 NOTE — PROGRESS NOTES
CHIEF COMPLAINT:    Mrs. Call is a 57 y.o. female presenting for a follow up on IC.    PRESENTING ILLNESS:    Meaghan Call is a 57 y.o. female who returns stating that for the past 2 months she has had pelvic pain that is not as bad as it has been but it was daily before.  She states that the [employer] works but her insurance no longer covers it.  Also, she has stress incontinence, does not wear a pad but finds that it is bothersome, sometimes has to change clothes.  Occurs with cough, sneeze.  She was referred to physical therapy but could only attend one session.  She states that her insurance changed and she could no longer afford the copay for PT.  She is interested in trying ApexM for the stress incontinence.  She has been doing Kegel exercises three times a day and has not noted an improvement.      REVIEW OF SYSTEMS:    Review of Systems   Constitutional: Negative.    HENT: Negative.    Eyes: Negative.    Respiratory: Negative.    Cardiovascular: Negative.    Gastrointestinal: Positive for heartburn.   Genitourinary: Positive for dysuria and urgency.        Pelvic pain   Musculoskeletal: Positive for joint pain.   Skin: Negative.    Neurological: Negative.    Endo/Heme/Allergies: Negative.    Psychiatric/Behavioral: Negative.      PATIENT HISTORY:    Past Medical History:   Diagnosis Date    Acid reflux     Gastroparesis     Hypertension     Interstitial cystitis 2004    Interstitial cystitis     Kidney stone     Osteopenia        Past Surgical History:   Procedure Laterality Date    BREAST BIOPSY Left     BENIGN    CHOLECYSTECTOMY      CYSTOSCOPY      HYSTERECTOMY      MUSCLE BIPOSY Right     Thigh area    OOPHORECTOMY      TUBAL LIGATION         No family history on file.    Social History    Marital status:      Social History Main Topics    Smoking status: Never Smoker    Smokeless tobacco: Never Used    Alcohol use No    Drug use: No    Sexual activity: Yes        Allergies:  Codeine    Medications:  Outpatient Encounter Prescriptions as of 5/11/2018   Medication Sig Dispense Refill    CALCIUM CARBONATE (CALCIUM 500 ORAL) Take by mouth.      estradiol (ESTRACE) 2 MG tablet Take 2 mg by mouth once daily.  6    famotidine (PEPCID) 40 MG tablet Take 40 mg by mouth once daily.      IRON, FERROUS SULFATE, ORAL Take by mouth.      beny-m.blue-s.phos-phsal-hyo (URELLE) 81-10.8-40.8 mg Tab Take 1 tablet by mouth 4 (four) times daily as needed. 120 each 6    sucralfate (CARAFATE) 1 gram tablet       propranolol (INDERAL) 10 MG tablet Take 10 mg by mouth every morning.        Facility-Administered Encounter Medications as of 5/11/2018   Medication Dose Route Frequency Provider Last Rate Last Dose    bupivacaine (PF) injection 100 mg  20 mL Intravesical 1 time in Clinic/ANGELES Booker MD        dimethyl sulfoxide 50 % solution 50 mL  50 mL Intravesical 1 time in Clinic/ANGELES Booker MD        estradiol cypionate 5 mg/mL injection 10 mg  10 mg Intramuscular Q28 Days Mercedes Eli MD   10 mg at 03/12/18 0958    heparin (porcine) injection 10,000 Units  10,000 Units Intravesical 1 time in Clinic/ANGELES Booker MD        hydrocortisone sodium succinate injection 100 mg  100 mg Intravesical 1 time in Clinic/ANGELES Booker MD        lidocaine HCL 10 mg/ml (1%) injection 20 mL  20 mL Intravesical 1 time in Clinic/ANGELES Booker MD        sodium bicarbonate solution 50 mEq  50 mEq Intravesical 1 time in Clinic/ANGELES Booker MD             PHYSICAL EXAMINATION:    The patient generally appears in good health, is appropriately interactive, and is in no apparent distress.    Skin: No lesions.    Mental: Cooperative with normal affect.    Neuro: Grossly intact.    HEENT: Normal. No evidence of lymphadenopathy.    Chest:  normal inspiratory effort.    Abdomen:  Soft, non-tender. No masses or organomegaly. Bladder is not  palpable. No evidence of flank discomfort. No evidence of inguinal hernia.    Extremities: No clubbing, cyanosis, or edema    Normal external female genitalia  White discharge present  Urethral meatus is normal  Urethra and bladder are nontender to bimanual exam  Well supported anteriorly and posteriorly   Uterus and cervix are surgically absent  No adnexal masses  Somewhat tender levator ani on the left side.   PVR by catheterization was 20 ml    LABS:    Lab Results   Component Value Date    BUN 11 11/08/2017    CREATININE 0.7 11/08/2017     UA 1.015, pH 5, otherwise, negative      IMPRESSION:    Encounter Diagnoses   Name Primary?    Interstitial cystitis Yes    Urinary, incontinence, stress female     Pelvic pain in female        PLAN:    1.  Vaginosis screen  2.  SUDS  3.  Letter for the Lesage M  4.  Interstitial cystitis bladder cocktail administered after prepping with Betadine, using 12 Fr catheter and draining the urine, administered under gravity drainage (DMSO, bicarb, heparin, solucortef, lidocaine, marcaine)    Time spent with patient 40 minutes with more than half face time and coordination of care.

## 2018-05-11 ENCOUNTER — OFFICE VISIT (OUTPATIENT)
Dept: UROLOGY | Facility: CLINIC | Age: 58
End: 2018-05-11
Payer: COMMERCIAL

## 2018-05-11 VITALS
HEIGHT: 61 IN | HEART RATE: 78 BPM | DIASTOLIC BLOOD PRESSURE: 83 MMHG | WEIGHT: 123.44 LBS | BODY MASS INDEX: 23.3 KG/M2 | SYSTOLIC BLOOD PRESSURE: 142 MMHG

## 2018-05-11 DIAGNOSIS — N30.10 INTERSTITIAL CYSTITIS: Primary | ICD-10-CM

## 2018-05-11 DIAGNOSIS — N39.3 URINARY, INCONTINENCE, STRESS FEMALE: ICD-10-CM

## 2018-05-11 DIAGNOSIS — R10.2 PELVIC PAIN IN FEMALE: ICD-10-CM

## 2018-05-11 LAB
CANDIDA RRNA VAG QL PROBE: NEGATIVE
G VAGINALIS RRNA GENITAL QL PROBE: NEGATIVE
T VAGINALIS RRNA GENITAL QL PROBE: NEGATIVE

## 2018-05-11 PROCEDURE — 99999 PR PBB SHADOW E&M-EST. PATIENT-LVL IV: CPT | Mod: PBBFAC,,, | Performed by: UROLOGY

## 2018-05-11 PROCEDURE — 51700 IRRIGATION OF BLADDER: CPT | Mod: S$GLB,,, | Performed by: UROLOGY

## 2018-05-11 PROCEDURE — 87480 CANDIDA DNA DIR PROBE: CPT

## 2018-05-11 PROCEDURE — 87510 GARDNER VAG DNA DIR PROBE: CPT

## 2018-05-11 PROCEDURE — 99214 OFFICE O/P EST MOD 30 MIN: CPT | Mod: 25,S$GLB,, | Performed by: UROLOGY

## 2018-05-11 PROCEDURE — 3008F BODY MASS INDEX DOCD: CPT | Mod: CPTII,S$GLB,, | Performed by: UROLOGY

## 2018-05-11 RX ORDER — LIDOCAINE HYDROCHLORIDE 10 MG/ML
20 INJECTION INFILTRATION; PERINEURAL
Status: COMPLETED | OUTPATIENT
Start: 2018-05-11 | End: 2018-05-11

## 2018-05-11 RX ORDER — BUPIVACAINE HYDROCHLORIDE 5 MG/ML
20 INJECTION, SOLUTION EPIDURAL; INTRACAUDAL
Status: COMPLETED | OUTPATIENT
Start: 2018-05-11 | End: 2018-05-11

## 2018-05-11 RX ORDER — HEPARIN SODIUM 10000 [USP'U]/ML
10000 INJECTION, SOLUTION INTRAVENOUS; SUBCUTANEOUS
Status: COMPLETED | OUTPATIENT
Start: 2018-05-11 | End: 2018-05-11

## 2018-05-11 RX ORDER — INDOMETHACIN 25 MG/1
50 CAPSULE ORAL
Status: COMPLETED | OUTPATIENT
Start: 2018-05-11 | End: 2018-05-11

## 2018-05-11 RX ORDER — LIDOCAINE HYDROCHLORIDE 20 MG/ML
JELLY TOPICAL ONCE
Status: CANCELLED | OUTPATIENT
Start: 2018-05-11 | End: 2018-05-11

## 2018-05-11 RX ORDER — AMOXICILLIN 250 MG/1
500 CAPSULE ORAL ONCE
Status: CANCELLED | OUTPATIENT
Start: 2018-05-11 | End: 2018-05-11

## 2018-05-11 RX ADMIN — LIDOCAINE HYDROCHLORIDE 20 ML: 10 INJECTION INFILTRATION; PERINEURAL at 09:05

## 2018-05-11 RX ADMIN — INDOMETHACIN 50 MEQ: 25 CAPSULE ORAL at 09:05

## 2018-05-11 RX ADMIN — HEPARIN SODIUM 10000 UNITS: 10000 INJECTION, SOLUTION INTRAVENOUS; SUBCUTANEOUS at 09:05

## 2018-05-11 RX ADMIN — BUPIVACAINE HYDROCHLORIDE 100 MG: 5 INJECTION, SOLUTION EPIDURAL; INTRACAUDAL at 09:05

## 2018-05-11 NOTE — LETTER
May 11, 2018      Celso Boyer MD  [address] [facility] [address]           [facility] - Urology 4th Floor  [address]  [place] [address]  Phone: [phone]          Patient: Meaghan Call   MR Number: [id number]   YOB: 1960   Date of Visit: 5/11/2018       Dear Dr. Celso Boyer:    Thank you for referring Meaghan Call to me for evaluation. Attached you will find relevant portions of my assessment and plan of care.    If you have questions, please do not hesitate to call me. I look forward to following Meaghan Call along with you.    Sincerely,    Ebonie Booker MD    Enclosure  CC:  No Recipients    If you would like to receive this communication electronically, please contact [email or web address] or [phone] to request more information on [employer] access.    For providers and/or their staff who would like to refer a patient to [facility], please contact us through our one-stop-shop provider referral line, [facility], at [phone].    If you feel you have received this communication in error or would no longer like to receive these types of communications, please e-mail [email or web address]

## 2018-05-11 NOTE — PATIENT INSTRUCTIONS
Urodynamics Studies     The bladder holds urine until it leaves the body through the urethra.     Urodynamics studies are a series of tests that give your doctor a close look at the working of your bladder and urethra. The tests can help your doctor learn about any problems storing urine or voiding (eliminating) urine from your body.  Understanding the lower urinary tract  The lower part of the urinary tract has several parts.  · The bladder stores urine until youre ready to release it.  · The urethra is the tube that carries urine from the bladder out of the body.  · The sphincter is made up of muscles around the opening of the bladder. The sphincter muscles tighten to hold urine in the bladder. They relax to let urine flow. Signals from the brain tell the sphincter when to tighten and relax. These signals also tell the bladder when to contract to let urine flow out of the body.  Why you need a urodynamics study  This test may be ordered if you:  · Are incontinent (leak urine)  · Have a bladder that does not empty all the way.  · Have symptoms such as the need to urinate often or a constant strong need to urinate  · Have intermittent or weak urine stream  · Have persistent urinary tract infections  Preparing for the study  · Tell your doctor about any medicine youre taking. Ask if you should stop them before the study.  · Keep a diary of your bathroom habits. Do this for a few days before the study. This diary can be a helpful part of the evaluation.  · Ask if you need to arrive for the study with a full bladder.  Date Last Reviewed: 1/1/2017  © 3255-8256 [employer]. [address] [place] [address], [place] [address]. All rights reserved. This information is not intended as a substitute for professional medical care. Always follow your healthcare professional's instructions.          Urodynamic Studies     The equipment used for the study varies depending upon the facility and what tests are done.      Urodynamic studies may be done in your doctors office, a clinic, or a hospital. The studies may take up to an hour or more. This depends on which tests your doctor does. The tests are generally painless. You wont need sedating medicine.  Tests that may be done  Uroflowmetry. This measures the amount and speed of urine you void from your bladder. You urinate into a funnel. Its attached to a computer that records your urine flow over time. The amount of urine left in your bladder after you void may also be measured right after this test.  Cystometry. This test evaluates how much your bladder can hold. It also measures how strong your bladder muscle is and how well the signals work that tell you when your bladder is full. Your healthcare provider fills your bladder with sterile water or saline solution, through a catheter. Your doctor will instruct you to report any sensations you feel. Mention if theyre similar to symptoms youve felt at home. Your doctor may ask you to cough, stand and walk, or bear down during this test.  Electromyogram. This helps evaluate the muscle contractions that control urination, such as sphincter muscle contractions. Your healthcare provider may place electrode patches or wires near your rectum or urethra to make the recording. He or she may ask you to try to tighten or relax your sphincter muscles during this test.  Pressure flow study. This test measures your detrusor, urethral, and abdominal pressures. Detrusor is the muscle surrounding the bladder walls that relaxes to allow your bladder to fill, and and contracts to squeeze out urine. A pressure flow study is often done after cystometry. Youre asked to urinate while a probe in your urethra measures pressures.  Video cystourethrography. This takes video pictures of urine flow through your urinary tract. It can help identify blockages or other problems. The bladder is filled with an X-ray contrast fluid. Then X-ray video  pictures are taken as the fluid is urinated out. Ultrasound imaging may also be combined with routine urodynamic studies.  Ambulatory urodynamics. This test can be used to evaluate you while doing usual activities.  Getting your results  After the study, youll get dressed and return to the consultation room. Test results may be ready soon after the study is finished. Or, you may return to your doctors office in a few days for your results. Your doctor can talk with you about the study report and your options.   Date Last Reviewed: 1/1/2017  © 4688-5101 [employer]. [address] [place] [address]. All rights reserved. This information is not intended as a substitute for professional medical care. Always follow your healthcare professional's instructions.

## 2018-05-16 ENCOUNTER — PATIENT MESSAGE (OUTPATIENT)
Dept: UROLOGY | Facility: CLINIC | Age: 58
End: 2018-05-16

## 2018-05-21 ENCOUNTER — PATIENT MESSAGE (OUTPATIENT)
Dept: UROLOGY | Facility: CLINIC | Age: 58
End: 2018-05-21

## 2018-07-21 PROBLEM — M54.50 ACUTE EXACERBATION OF CHRONIC LOW BACK PAIN: Status: ACTIVE | Noted: 2018-07-21

## 2018-07-21 PROBLEM — M54.16 LUMBAR RADICULOPATHY, ACUTE: Status: ACTIVE | Noted: 2018-07-21

## 2018-07-21 PROBLEM — G89.29 ACUTE EXACERBATION OF CHRONIC LOW BACK PAIN: Status: ACTIVE | Noted: 2018-07-21

## 2018-12-10 ENCOUNTER — PATIENT MESSAGE (OUTPATIENT)
Dept: UROLOGY | Facility: CLINIC | Age: 58
End: 2018-12-10

## 2018-12-10 ENCOUNTER — OFFICE VISIT (OUTPATIENT)
Dept: UROLOGY | Facility: CLINIC | Age: 58
End: 2018-12-10
Payer: COMMERCIAL

## 2018-12-10 VITALS
SYSTOLIC BLOOD PRESSURE: 157 MMHG | DIASTOLIC BLOOD PRESSURE: 93 MMHG | HEART RATE: 83 BPM | WEIGHT: 122.81 LBS | BODY MASS INDEX: 23.19 KG/M2 | HEIGHT: 61 IN

## 2018-12-10 DIAGNOSIS — R39.89 BLADDER PAIN: ICD-10-CM

## 2018-12-10 DIAGNOSIS — N30.10 INTERSTITIAL CYSTITIS: Primary | ICD-10-CM

## 2018-12-10 PROCEDURE — 81002 URINALYSIS NONAUTO W/O SCOPE: CPT | Mod: S$GLB,,, | Performed by: PHYSICIAN ASSISTANT

## 2018-12-10 PROCEDURE — 3008F BODY MASS INDEX DOCD: CPT | Mod: CPTII,S$GLB,, | Performed by: PHYSICIAN ASSISTANT

## 2018-12-10 PROCEDURE — 99214 OFFICE O/P EST MOD 30 MIN: CPT | Mod: 25,S$GLB,, | Performed by: PHYSICIAN ASSISTANT

## 2018-12-10 PROCEDURE — 87086 URINE CULTURE/COLONY COUNT: CPT

## 2018-12-10 PROCEDURE — 99999 PR PBB SHADOW E&M-EST. PATIENT-LVL IV: CPT | Mod: PBBFAC,,, | Performed by: PHYSICIAN ASSISTANT

## 2018-12-10 RX ORDER — ESTRADIOL 10 UG/1
TABLET VAGINAL
Refills: 5 | COMMUNITY
Start: 2018-09-30 | End: 2021-09-17

## 2018-12-10 RX ORDER — ESOMEPRAZOLE MAGNESIUM 40 MG/1
CAPSULE, DELAYED RELEASE ORAL
COMMUNITY
End: 2022-06-21

## 2018-12-10 RX ORDER — IBUPROFEN 800 MG/1
TABLET ORAL
Refills: 3 | COMMUNITY
Start: 2018-09-21 | End: 2022-06-21

## 2018-12-10 RX ORDER — AMITRIPTYLINE HYDROCHLORIDE 10 MG/1
10 TABLET, FILM COATED ORAL NIGHTLY PRN
Qty: 30 TABLET | Refills: 11 | Status: SHIPPED | OUTPATIENT
Start: 2018-12-10 | End: 2019-12-05 | Stop reason: SDUPTHER

## 2018-12-10 NOTE — PROGRESS NOTES
CHIEF COMPLAINT:    Mrs. Call is a 58 y.o. female presenting for bladder pain.  PRESENTING ILLNESS:    Meaghan Call is a 58 y.o. female with a PMH of IC who presents for bladder pain.     She reports bladder pain x 2-3 months.  Today it is mild.  It is intermittent but occurs daily.  She was taking elavil at night which helped.  She ran out of it about a year and hasn't been back to her urologist in [facility] to get a refill.    She is taking ibuprofen which helps her bladder pain but it causes stomach issues.  She takes an ibuprofen in the morning when she knows she is going to have a busy day.  She will like to restart elavil so she doesn't have to take ibuprofen.  She reports being under a lot of stress this year.  She has taken elmiron in the past.  She did not take it long because she started experiencing weakness.  She is not sure if the weakness was due to elmiron.  She stopped taking urelle because her insurance no longer covered it.    A few weeks ago she experienced dysuria.  It has improved.  She denies urgency and frequency.   She reports drinking cranberry juice.    She did ok with the bladder instillation that she had at her last visit.    She denies gross hematuria.    She has a history of stress incontinence.  She was not able to continue pelvic floor PT.    REVIEW OF SYSTEMS:  Constitutional: Negative for fever and chills.   HENT: Negative for hearing loss.   Eyes: Negative for visual disturbance.   Respiratory: Negative for shortness of breath.   Cardiovascular: Negative for chest pain.   Gastrointestinal: Negative for vomiting, and constipation.   Genitourinary:  See HPI  Neurological: Negative for dizziness.   Hematological: Does not bruise/bleed easily.   Psychiatric/Behavioral: Negative for confusion.     PATIENT HISTORY:    Past Medical History:   Diagnosis Date    Acid reflux     Gastroparesis     Hypertension     Interstitial cystitis 2004    Interstitial cystitis     Kidney  stone     Osteopenia        Past Surgical History:   Procedure Laterality Date    BREAST BIOPSY Left     BENIGN    CHOLECYSTECTOMY      CYSTOSCOPY      CYSTOSCOPY N/A 10/31/2017    Performed by Ebonie Booker MD at [facility] OR [address] [place] [address]    HYSTERECTOMY      MUSCLE BIPOSY Right     Thigh area    OOPHORECTOMY      TUBAL LIGATION         No family history on file.    Social History     Socioeconomic History    Marital status:      Spouse name: Not on file    Number of children: Not on file    Years of education: Not on file    Highest education level: Not on file   Social Needs    Financial resource strain: Not on file    Food insecurity - worry: Not on file    Food insecurity - inability: Not on file    Transportation needs - medical: Not on file    Transportation needs - non-medical: Not on file   Occupational History    Not on file   Tobacco Use    Smoking status: Never Smoker    Smokeless tobacco: Never Used   Substance and Sexual Activity    Alcohol use: No    Drug use: No    Sexual activity: Yes   Other Topics Concern    Not on file   Social History Narrative    Not on file       Allergies:  Codeine    Medications:    Current Outpatient Medications:     CALCIUM CARBONATE (CALCIUM 500 ORAL), Take by mouth., Disp: , Rfl:     esomeprazole (NEXIUM) 40 MG capsule, Nexium 40 mg capsule,delayed release  Take 1 capsule every day by oral route., Disp: , Rfl:     famotidine (PEPCID) 40 MG tablet, Take 40 mg by mouth once daily., Disp: , Rfl:      mg tablet, TAKE 1 TABLET BY MOUTH 3 TIMES A DAY WITH FOOD, Disp: , Rfl: 3    IRON, FERROUS SULFATE, ORAL, Take by mouth., Disp: , Rfl:     propranolol (INDERAL) 10 MG tablet, Take 10 mg by mouth every morning. , Disp: , Rfl:     YUVAFEM 10 mcg Tab, INSERT 1 TAB INTRAVAGINALLY ONCE DAILY FOR 2 WEEKS, THEN 1 TABLET TWICE WEEKLY, Disp: , Rfl: 5    amitriptyline (ELAVIL) 10 MG tablet, Take 1 tablet (10 mg total) by mouth nightly as needed.,  Disp: 30 tablet, Rfl: 11    methen-m.blue-s.phos-phsal-hyo (URELLE) 81-10.8-40.8 mg Tab, Take 1 tablet by mouth 4 (four) times daily as needed., Disp: 120 each, Rfl: 6    pentosan polysulfate (ELMIRON) 100 mg Cap, Take 1 capsule (100 mg total) by mouth 3 (three) times daily., Disp: 90 capsule, Rfl: 6    Current Facility-Administered Medications:     estradiol cypionate 5 mg/mL injection 10 mg, 10 mg, Intramuscular, Q28 Days, Mercedes Eli MD, 10 mg at 03/12/18 0958    PHYSICAL EXAMINATION:    Constitutional: She appears well-developed and well-nourished.  She is in no apparent distress.    Eyes: No scleral icterus noted bilaterally. No discharge bilaterally.    Nose: No rhinorrhea    Cardiovascular: Normal rate.  No pitting edema noted in lower extremities bilaterally    Pulmonary/Chest: Effort normal. No respiratory distress.     Abdominal:  She exhibits no distension.  There is no CVA tenderness.     Lymphadenopathy:          Right: No supraclavicular adenopathy present.        Left: No supraclavicular adenopathy present.     Neurological: She is alert and oriented to person, place, and time.     Skin: Skin is warm and dry.     Psych: Cooperative with normal affect.    Consent verbally obtained.  Betadine prep was applied to the urethral meatus. An in and out cath was performed after voiding.      Physical Exam      LABS:    U/a: 1.015, pH 5, 50 blood otherwise negative. 1- 2 RBC seen per hpf on in office microscopic u/a    IMPRESSION:    Encounter Diagnoses   Name Primary?    Interstitial cystitis Yes    Bladder pain        PLAN:  Urine culture  Elavil given. We will start at 10mg nightly for 1 week.  She was instructed to contact the office after a week to give an update on if she experienced any adverse effects.  If no side effects noted, she will increase her dose to 20mg nightly (2 pills/night).  Once she finishes the bottle, a new prescription for a 25mg pill will be sent.  She will take 1 (25mg)  pill nightly.  She will like to take elmiron again.  Rx given     Follow up in 3 months    Esperanza Marcus PA-C

## 2018-12-12 LAB — BACTERIA UR CULT: NO GROWTH

## 2019-12-03 DIAGNOSIS — N30.10 INTERSTITIAL CYSTITIS: ICD-10-CM

## 2019-12-05 DIAGNOSIS — N30.10 INTERSTITIAL CYSTITIS: ICD-10-CM

## 2019-12-05 RX ORDER — AMITRIPTYLINE HYDROCHLORIDE 10 MG/1
10 TABLET, FILM COATED ORAL NIGHTLY PRN
Qty: 30 TABLET | Refills: 0 | Status: SHIPPED | OUTPATIENT
Start: 2019-12-05 | End: 2020-01-02

## 2019-12-05 NOTE — TELEPHONE ENCOUNTER
----- Message from Guillermnia Dhaliwal sent at 12/5/2019  1:38 PM CST -----  Contact: [facility] [place] / tel:  [phone]   Ref:   AMITRIPTYLINE  /  Needs refill ok.   Pls call w/ the ok.

## 2019-12-29 DIAGNOSIS — N30.10 INTERSTITIAL CYSTITIS: ICD-10-CM

## 2019-12-31 RX ORDER — AMITRIPTYLINE HYDROCHLORIDE 10 MG/1
TABLET, FILM COATED ORAL
Qty: 30 TABLET | Refills: 11 | OUTPATIENT
Start: 2019-12-31

## 2020-01-02 RX ORDER — AMITRIPTYLINE HYDROCHLORIDE 10 MG/1
TABLET, FILM COATED ORAL
Qty: 30 TABLET | Refills: 0 | Status: SHIPPED | OUTPATIENT
Start: 2020-01-02 | End: 2020-02-10

## 2020-02-09 DIAGNOSIS — N30.10 INTERSTITIAL CYSTITIS: ICD-10-CM

## 2020-02-10 RX ORDER — AMITRIPTYLINE HYDROCHLORIDE 10 MG/1
TABLET, FILM COATED ORAL
Qty: 30 TABLET | Refills: 0 | Status: SHIPPED | OUTPATIENT
Start: 2020-02-10 | End: 2020-04-07

## 2020-04-04 DIAGNOSIS — N30.10 INTERSTITIAL CYSTITIS: ICD-10-CM

## 2020-04-07 RX ORDER — AMITRIPTYLINE HYDROCHLORIDE 10 MG/1
TABLET, FILM COATED ORAL
Qty: 30 TABLET | Refills: 2 | Status: SHIPPED | OUTPATIENT
Start: 2020-04-07 | End: 2020-07-27

## 2020-04-07 NOTE — TELEPHONE ENCOUNTER
This patient's refill was approved during the COVID-19 pandemic. We are under stay at home orders.

## 2020-07-27 ENCOUNTER — TELEPHONE (OUTPATIENT)
Dept: UROLOGY | Facility: CLINIC | Age: 60
End: 2020-07-27

## 2020-08-19 ENCOUNTER — TELEPHONE (OUTPATIENT)
Dept: UROLOGY | Facility: CLINIC | Age: 60
End: 2020-08-19

## 2020-09-23 ENCOUNTER — TELEPHONE (OUTPATIENT)
Dept: SPINE | Facility: CLINIC | Age: 60
End: 2020-09-23

## 2020-09-23 NOTE — TELEPHONE ENCOUNTER
Pt contacted and advised to contact her insurance company to inquire about cost and co-pays. Patient will call back to schedule with ANY [facility] provider once she speaks with her insurance company. No further questions or concerns at this time.

## 2020-09-23 NOTE — TELEPHONE ENCOUNTER
----- Message from Fiordaliza Dhaliwal sent at 9/23/2020  9:37 AM CDT -----   Name of Who is Calling:     What is the request in detail: patient request call back in reference to want to know if doctor can consult for 2nd opinion and   cost of visit with her insurance Please contact to further discuss and advise      Can the clinic reply by [facility]: no     What Number to Call Back if not in [facility]:  [phone]

## 2020-10-03 ENCOUNTER — TELEPHONE (OUTPATIENT)
Dept: UROLOGY | Facility: HOSPITAL | Age: 60
End: 2020-10-03

## 2020-12-07 PROBLEM — M48.062 SPINAL STENOSIS, LUMBAR REGION WITH NEUROGENIC CLAUDICATION: Status: ACTIVE | Noted: 2020-12-07

## 2021-04-30 NOTE — INTERVAL H&P NOTE
The patient has been examined and the H&P has been reviewed:    I concur with the findings and no changes have occurred since H&P was written.     UA +blood, neg nitrites, leuks    Anesthesia/Surgery risks, benefits and alternative options discussed and understood by patient/family.          Active Hospital Problems    Diagnosis  POA    Stress incontinence [N39.3]  Yes      Resolved Hospital Problems    Diagnosis Date Resolved POA   No resolved problems to display.     Patient seen in holding.  No changes in clinical condition.  Proceed with planned procedure.       Shelia Mcdonald

## 2021-07-19 ENCOUNTER — OFFICE VISIT (OUTPATIENT)
Dept: UROLOGY | Facility: CLINIC | Age: 61
End: 2021-07-19
Payer: COMMERCIAL

## 2021-07-19 VITALS
DIASTOLIC BLOOD PRESSURE: 77 MMHG | HEART RATE: 69 BPM | SYSTOLIC BLOOD PRESSURE: 129 MMHG | WEIGHT: 121.25 LBS | HEIGHT: 61 IN | BODY MASS INDEX: 22.89 KG/M2

## 2021-07-19 DIAGNOSIS — N30.10 INTERSTITIAL CYSTITIS: Primary | ICD-10-CM

## 2021-07-19 DIAGNOSIS — R82.90 ABNORMAL FINDING ON URINALYSIS: ICD-10-CM

## 2021-07-19 DIAGNOSIS — R39.89 BLADDER PAIN: ICD-10-CM

## 2021-07-19 LAB
BACTERIA #/AREA URNS AUTO: NORMAL /HPF
MICROSCOPIC COMMENT: NORMAL
RBC #/AREA URNS AUTO: 1 /HPF (ref 0–4)
SQUAMOUS #/AREA URNS AUTO: 0 /HPF
WBC #/AREA URNS AUTO: 0 /HPF (ref 0–5)

## 2021-07-19 PROCEDURE — 99213 PR OFFICE/OUTPT VISIT, EST, LEVL III, 20-29 MIN: ICD-10-PCS | Mod: S$GLB,,, | Performed by: PHYSICIAN ASSISTANT

## 2021-07-19 PROCEDURE — 3008F BODY MASS INDEX DOCD: CPT | Mod: CPTII,S$GLB,, | Performed by: PHYSICIAN ASSISTANT

## 2021-07-19 PROCEDURE — 1125F PR PAIN SEVERITY QUANTIFIED, PAIN PRESENT: ICD-10-PCS | Mod: CPTII,S$GLB,, | Performed by: PHYSICIAN ASSISTANT

## 2021-07-19 PROCEDURE — 1125F AMNT PAIN NOTED PAIN PRSNT: CPT | Mod: CPTII,S$GLB,, | Performed by: PHYSICIAN ASSISTANT

## 2021-07-19 PROCEDURE — 99999 PR PBB SHADOW E&M-EST. PATIENT-LVL III: ICD-10-PCS | Mod: PBBFAC,,, | Performed by: PHYSICIAN ASSISTANT

## 2021-07-19 PROCEDURE — 99213 OFFICE O/P EST LOW 20 MIN: CPT | Mod: S$GLB,,, | Performed by: PHYSICIAN ASSISTANT

## 2021-07-19 PROCEDURE — 81001 URINALYSIS AUTO W/SCOPE: CPT | Performed by: PHYSICIAN ASSISTANT

## 2021-07-19 PROCEDURE — 99999 PR PBB SHADOW E&M-EST. PATIENT-LVL III: CPT | Mod: PBBFAC,,, | Performed by: PHYSICIAN ASSISTANT

## 2021-07-19 PROCEDURE — 3008F PR BODY MASS INDEX (BMI) DOCUMENTED: ICD-10-PCS | Mod: CPTII,S$GLB,, | Performed by: PHYSICIAN ASSISTANT

## 2021-07-19 RX ORDER — AMITRIPTYLINE HYDROCHLORIDE 10 MG/1
10 TABLET, FILM COATED ORAL NIGHTLY
Qty: 30 TABLET | Refills: 11 | Status: SHIPPED | OUTPATIENT
Start: 2021-07-19 | End: 2022-09-21 | Stop reason: SDUPTHER

## 2021-07-20 ENCOUNTER — PATIENT MESSAGE (OUTPATIENT)
Dept: UROLOGY | Facility: CLINIC | Age: 61
End: 2021-07-20

## 2021-09-17 ENCOUNTER — LAB VISIT (OUTPATIENT)
Dept: LAB | Facility: HOSPITAL | Age: 61
End: 2021-09-17
Attending: PHYSICIAN ASSISTANT
Payer: COMMERCIAL

## 2021-09-17 ENCOUNTER — PATIENT MESSAGE (OUTPATIENT)
Dept: UROLOGY | Facility: CLINIC | Age: 61
End: 2021-09-17

## 2021-09-17 ENCOUNTER — OFFICE VISIT (OUTPATIENT)
Dept: UROLOGY | Facility: CLINIC | Age: 61
End: 2021-09-17
Payer: COMMERCIAL

## 2021-09-17 VITALS
DIASTOLIC BLOOD PRESSURE: 73 MMHG | HEART RATE: 76 BPM | HEIGHT: 61 IN | BODY MASS INDEX: 23.39 KG/M2 | SYSTOLIC BLOOD PRESSURE: 116 MMHG | WEIGHT: 123.88 LBS

## 2021-09-17 DIAGNOSIS — R31.0 GROSS HEMATURIA: ICD-10-CM

## 2021-09-17 DIAGNOSIS — R30.0 DYSURIA: Primary | ICD-10-CM

## 2021-09-17 DIAGNOSIS — R39.89 SENSATION OF PRESSURE IN BLADDER AREA: ICD-10-CM

## 2021-09-17 DIAGNOSIS — R10.30 LOWER ABDOMINAL PAIN: ICD-10-CM

## 2021-09-17 LAB
CREAT SERPL-MCNC: 0.8 MG/DL (ref 0.5–1.4)
EST. GFR  (AFRICAN AMERICAN): >60 ML/MIN/1.73 M^2
EST. GFR  (NON AFRICAN AMERICAN): >60 ML/MIN/1.73 M^2

## 2021-09-17 PROCEDURE — 1159F MED LIST DOCD IN RCRD: CPT | Mod: CPTII,S$GLB,, | Performed by: PHYSICIAN ASSISTANT

## 2021-09-17 PROCEDURE — 99214 OFFICE O/P EST MOD 30 MIN: CPT | Mod: 25,S$GLB,, | Performed by: PHYSICIAN ASSISTANT

## 2021-09-17 PROCEDURE — 88112 CYTOPATH CELL ENHANCE TECH: CPT | Performed by: PATHOLOGY

## 2021-09-17 PROCEDURE — 1160F PR REVIEW ALL MEDS BY PRESCRIBER/CLIN PHARMACIST DOCUMENTED: ICD-10-PCS | Mod: CPTII,S$GLB,, | Performed by: PHYSICIAN ASSISTANT

## 2021-09-17 PROCEDURE — 99999 PR PBB SHADOW E&M-EST. PATIENT-LVL IV: CPT | Mod: PBBFAC,,, | Performed by: PHYSICIAN ASSISTANT

## 2021-09-17 PROCEDURE — 87186 SC STD MICRODIL/AGAR DIL: CPT | Performed by: PHYSICIAN ASSISTANT

## 2021-09-17 PROCEDURE — 3078F DIAST BP <80 MM HG: CPT | Mod: CPTII,S$GLB,, | Performed by: PHYSICIAN ASSISTANT

## 2021-09-17 PROCEDURE — 87077 CULTURE AEROBIC IDENTIFY: CPT | Performed by: PHYSICIAN ASSISTANT

## 2021-09-17 PROCEDURE — 36415 COLL VENOUS BLD VENIPUNCTURE: CPT | Performed by: PHYSICIAN ASSISTANT

## 2021-09-17 PROCEDURE — 3008F PR BODY MASS INDEX (BMI) DOCUMENTED: ICD-10-PCS | Mod: CPTII,S$GLB,, | Performed by: PHYSICIAN ASSISTANT

## 2021-09-17 PROCEDURE — 51700 PR IRRIGATION, BLADDER: ICD-10-PCS | Mod: S$GLB,,, | Performed by: PHYSICIAN ASSISTANT

## 2021-09-17 PROCEDURE — 1159F PR MEDICATION LIST DOCUMENTED IN MEDICAL RECORD: ICD-10-PCS | Mod: CPTII,S$GLB,, | Performed by: PHYSICIAN ASSISTANT

## 2021-09-17 PROCEDURE — 3078F PR MOST RECENT DIASTOLIC BLOOD PRESSURE < 80 MM HG: ICD-10-PCS | Mod: CPTII,S$GLB,, | Performed by: PHYSICIAN ASSISTANT

## 2021-09-17 PROCEDURE — 3074F PR MOST RECENT SYSTOLIC BLOOD PRESSURE < 130 MM HG: ICD-10-PCS | Mod: CPTII,S$GLB,, | Performed by: PHYSICIAN ASSISTANT

## 2021-09-17 PROCEDURE — 82565 ASSAY OF CREATININE: CPT | Performed by: PHYSICIAN ASSISTANT

## 2021-09-17 PROCEDURE — 1160F RVW MEDS BY RX/DR IN RCRD: CPT | Mod: CPTII,S$GLB,, | Performed by: PHYSICIAN ASSISTANT

## 2021-09-17 PROCEDURE — 3074F SYST BP LT 130 MM HG: CPT | Mod: CPTII,S$GLB,, | Performed by: PHYSICIAN ASSISTANT

## 2021-09-17 PROCEDURE — 88112 PR  CYTOPATH, CELL ENHANCE TECH: ICD-10-PCS | Mod: 26,,, | Performed by: PATHOLOGY

## 2021-09-17 PROCEDURE — 87086 URINE CULTURE/COLONY COUNT: CPT | Performed by: PHYSICIAN ASSISTANT

## 2021-09-17 PROCEDURE — 87088 URINE BACTERIA CULTURE: CPT | Performed by: PHYSICIAN ASSISTANT

## 2021-09-17 PROCEDURE — 99214 PR OFFICE/OUTPT VISIT, EST, LEVL IV, 30-39 MIN: ICD-10-PCS | Mod: 25,S$GLB,, | Performed by: PHYSICIAN ASSISTANT

## 2021-09-17 PROCEDURE — 81002 PR URINALYSIS NONAUTO W/O SCOPE: ICD-10-PCS | Mod: S$GLB,,, | Performed by: PHYSICIAN ASSISTANT

## 2021-09-17 PROCEDURE — 99999 PR PBB SHADOW E&M-EST. PATIENT-LVL IV: ICD-10-PCS | Mod: PBBFAC,,, | Performed by: PHYSICIAN ASSISTANT

## 2021-09-17 PROCEDURE — 88112 CYTOPATH CELL ENHANCE TECH: CPT | Mod: 26,,, | Performed by: PATHOLOGY

## 2021-09-17 PROCEDURE — 3008F BODY MASS INDEX DOCD: CPT | Mod: CPTII,S$GLB,, | Performed by: PHYSICIAN ASSISTANT

## 2021-09-17 PROCEDURE — 81002 URINALYSIS NONAUTO W/O SCOPE: CPT | Mod: S$GLB,,, | Performed by: PHYSICIAN ASSISTANT

## 2021-09-17 PROCEDURE — 51700 IRRIGATION OF BLADDER: CPT | Mod: S$GLB,,, | Performed by: PHYSICIAN ASSISTANT

## 2021-09-17 RX ORDER — AMLODIPINE BESYLATE 2.5 MG/1
2.5 TABLET ORAL DAILY
COMMUNITY
Start: 2021-07-01

## 2021-09-17 RX ORDER — BUPIVACAINE HYDROCHLORIDE 5 MG/ML
20 INJECTION, SOLUTION PERINEURAL
Status: COMPLETED | OUTPATIENT
Start: 2021-09-17 | End: 2021-09-17

## 2021-09-17 RX ORDER — NITROFURANTOIN 25; 75 MG/1; MG/1
100 CAPSULE ORAL 2 TIMES DAILY
Qty: 14 CAPSULE | Refills: 0 | Status: SHIPPED | OUTPATIENT
Start: 2021-09-17 | End: 2021-09-24

## 2021-09-17 RX ORDER — GENTAMICIN SULFATE 40 MG/ML
80 INJECTION, SOLUTION INTRAMUSCULAR; INTRAVENOUS ONCE
Status: COMPLETED | OUTPATIENT
Start: 2021-09-17 | End: 2021-09-17

## 2021-09-17 RX ORDER — LIDOCAINE HYDROCHLORIDE 10 MG/ML
20 INJECTION INFILTRATION; PERINEURAL
Status: COMPLETED | OUTPATIENT
Start: 2021-09-17 | End: 2021-09-17

## 2021-09-17 RX ADMIN — BUPIVACAINE HYDROCHLORIDE 100 MG: 5 INJECTION, SOLUTION PERINEURAL at 02:09

## 2021-09-17 RX ADMIN — GENTAMICIN SULFATE 80 MG: 40 INJECTION, SOLUTION INTRAMUSCULAR; INTRAVENOUS at 02:09

## 2021-09-17 RX ADMIN — LIDOCAINE HYDROCHLORIDE 20 ML: 10 INJECTION INFILTRATION; PERINEURAL at 02:09

## 2021-09-20 LAB — BACTERIA UR CULT: ABNORMAL

## 2021-09-20 RX ORDER — DOXYCYCLINE 100 MG/1
100 CAPSULE ORAL EVERY 12 HOURS
Qty: 14 CAPSULE | Refills: 0 | Status: SHIPPED | OUTPATIENT
Start: 2021-09-20 | End: 2021-09-27

## 2021-09-21 ENCOUNTER — PATIENT MESSAGE (OUTPATIENT)
Dept: UROLOGY | Facility: CLINIC | Age: 61
End: 2021-09-21

## 2021-09-21 LAB
FINAL PATHOLOGIC DIAGNOSIS: NORMAL
Lab: NORMAL

## 2021-09-27 ENCOUNTER — PATIENT MESSAGE (OUTPATIENT)
Dept: UROLOGY | Facility: CLINIC | Age: 61
End: 2021-09-27

## 2021-09-28 ENCOUNTER — PATIENT MESSAGE (OUTPATIENT)
Dept: UROLOGY | Facility: CLINIC | Age: 61
End: 2021-09-28

## 2021-09-29 ENCOUNTER — TELEPHONE (OUTPATIENT)
Dept: UROLOGY | Facility: CLINIC | Age: 61
End: 2021-09-29

## 2021-09-29 ENCOUNTER — HOSPITAL ENCOUNTER (OUTPATIENT)
Dept: RADIOLOGY | Facility: HOSPITAL | Age: 61
Discharge: HOME | End: 2021-09-29
Attending: PHYSICIAN ASSISTANT
Payer: COMMERCIAL

## 2021-09-29 ENCOUNTER — PATIENT MESSAGE (OUTPATIENT)
Dept: UROLOGY | Facility: CLINIC | Age: 61
End: 2021-09-29

## 2021-09-29 DIAGNOSIS — R31.0 GROSS HEMATURIA: ICD-10-CM

## 2021-09-29 DIAGNOSIS — N39.0 URINARY TRACT INFECTION WITHOUT HEMATURIA, SITE UNSPECIFIED: Primary | ICD-10-CM

## 2021-09-29 PROCEDURE — 25500020 PHARM REV CODE 255: Performed by: PHYSICIAN ASSISTANT

## 2021-09-29 PROCEDURE — 74178 CT ABD&PLV WO CNTR FLWD CNTR: CPT | Mod: TC

## 2021-09-29 PROCEDURE — 74178 CT UROGRAM ABD PELVIS W WO: ICD-10-PCS | Mod: 26,,, | Performed by: RADIOLOGY

## 2021-09-29 PROCEDURE — 74178 CT ABD&PLV WO CNTR FLWD CNTR: CPT | Mod: 26,,, | Performed by: RADIOLOGY

## 2021-09-29 RX ADMIN — IOHEXOL 123 ML: 350 INJECTION, SOLUTION INTRAVENOUS at 10:09

## 2022-06-21 PROBLEM — K21.9 GASTROESOPHAGEAL REFLUX DISEASE: Status: ACTIVE | Noted: 2020-01-19

## 2022-06-21 PROBLEM — N83.209 CYST OF OVARY: Status: ACTIVE | Noted: 2022-06-21

## 2022-09-15 ENCOUNTER — TELEPHONE (OUTPATIENT)
Dept: UROLOGY | Facility: CLINIC | Age: 62
End: 2022-09-15
Payer: COMMERCIAL

## 2022-09-15 NOTE — TELEPHONE ENCOUNTER
----- Message from Hailey Martell sent at 9/15/2022  8:39 AM CDT -----  Contact: [phone]  Pt is calling to see if you all have received & reviewed faxed kidney scans from her PCP.     Pt also wondering if she needs to be seen after reviewing scan     Pt also needs refill:   Type:  RX Refill Request    New or Refill: refill    Who Called: pt    RX Name and Strength: amitriptyline (ELAVIL) 10 MG tablet 30 tablet     Preferred Pharmacy with phone number:   [facility]/pharmacy #[id number] - [place] - [address] AT [place] [address] [address] [place] [address]  Phone: [phone] Fax: [phone]        Best Call Back Number: [phone]        Additional Information:

## 2022-09-21 ENCOUNTER — TELEPHONE (OUTPATIENT)
Dept: UROLOGY | Facility: CLINIC | Age: 62
End: 2022-09-21
Payer: COMMERCIAL

## 2022-09-21 DIAGNOSIS — N30.10 INTERSTITIAL CYSTITIS: ICD-10-CM

## 2022-09-21 RX ORDER — AMITRIPTYLINE HYDROCHLORIDE 10 MG/1
10 TABLET, FILM COATED ORAL NIGHTLY
Qty: 30 TABLET | Refills: 0 | Status: SHIPPED | OUTPATIENT
Start: 2022-09-21 | End: 2022-09-30 | Stop reason: SDUPTHER

## 2022-09-21 NOTE — TELEPHONE ENCOUNTER
----- Message from Katlin Contreras sent at 9/21/2022  1:39 PM CDT -----  Regarding: Kidney Scan  Contact: pt @ [phone]  Message is for Roxana, pt is calling to see if office received faxed of results from her kidney scan.  Also pt is in need of a refill asap:amitriptyline (ELAVIL) 10 MG tablet.     Asking for a call back

## 2022-09-30 ENCOUNTER — TELEPHONE (OUTPATIENT)
Dept: UROLOGY | Facility: CLINIC | Age: 62
End: 2022-09-30

## 2022-09-30 ENCOUNTER — OFFICE VISIT (OUTPATIENT)
Dept: UROLOGY | Facility: CLINIC | Age: 62
End: 2022-09-30
Payer: COMMERCIAL

## 2022-09-30 VITALS
BODY MASS INDEX: 24.06 KG/M2 | HEIGHT: 61 IN | DIASTOLIC BLOOD PRESSURE: 71 MMHG | SYSTOLIC BLOOD PRESSURE: 122 MMHG | WEIGHT: 127.44 LBS | HEART RATE: 67 BPM

## 2022-09-30 DIAGNOSIS — N30.10 INTERSTITIAL CYSTITIS: ICD-10-CM

## 2022-09-30 DIAGNOSIS — R31.29 MICROHEMATURIA: Primary | ICD-10-CM

## 2022-09-30 PROCEDURE — 3074F PR MOST RECENT SYSTOLIC BLOOD PRESSURE < 130 MM HG: ICD-10-PCS | Mod: CPTII,S$GLB,, | Performed by: UROLOGY

## 2022-09-30 PROCEDURE — 99999 PR PBB SHADOW E&M-EST. PATIENT-LVL III: CPT | Mod: PBBFAC,,, | Performed by: UROLOGY

## 2022-09-30 PROCEDURE — 1159F MED LIST DOCD IN RCRD: CPT | Mod: CPTII,S$GLB,, | Performed by: UROLOGY

## 2022-09-30 PROCEDURE — 4010F ACE/ARB THERAPY RXD/TAKEN: CPT | Mod: CPTII,S$GLB,, | Performed by: UROLOGY

## 2022-09-30 PROCEDURE — 3008F BODY MASS INDEX DOCD: CPT | Mod: CPTII,S$GLB,, | Performed by: UROLOGY

## 2022-09-30 PROCEDURE — 81002 URINALYSIS NONAUTO W/O SCOPE: CPT | Mod: S$GLB,,, | Performed by: UROLOGY

## 2022-09-30 PROCEDURE — 1159F PR MEDICATION LIST DOCUMENTED IN MEDICAL RECORD: ICD-10-PCS | Mod: CPTII,S$GLB,, | Performed by: UROLOGY

## 2022-09-30 PROCEDURE — 99999 PR PBB SHADOW E&M-EST. PATIENT-LVL III: ICD-10-PCS | Mod: PBBFAC,,, | Performed by: UROLOGY

## 2022-09-30 PROCEDURE — 99215 OFFICE O/P EST HI 40 MIN: CPT | Mod: S$GLB,,, | Performed by: UROLOGY

## 2022-09-30 PROCEDURE — 4010F PR ACE/ARB THEARPY RXD/TAKEN: ICD-10-PCS | Mod: CPTII,S$GLB,, | Performed by: UROLOGY

## 2022-09-30 PROCEDURE — 3008F PR BODY MASS INDEX (BMI) DOCUMENTED: ICD-10-PCS | Mod: CPTII,S$GLB,, | Performed by: UROLOGY

## 2022-09-30 PROCEDURE — 81002 PR URINALYSIS NONAUTO W/O SCOPE: ICD-10-PCS | Mod: S$GLB,,, | Performed by: UROLOGY

## 2022-09-30 PROCEDURE — 99215 PR OFFICE/OUTPT VISIT, EST, LEVL V, 40-54 MIN: ICD-10-PCS | Mod: S$GLB,,, | Performed by: UROLOGY

## 2022-09-30 PROCEDURE — 3078F PR MOST RECENT DIASTOLIC BLOOD PRESSURE < 80 MM HG: ICD-10-PCS | Mod: CPTII,S$GLB,, | Performed by: UROLOGY

## 2022-09-30 PROCEDURE — 3078F DIAST BP <80 MM HG: CPT | Mod: CPTII,S$GLB,, | Performed by: UROLOGY

## 2022-09-30 PROCEDURE — 3074F SYST BP LT 130 MM HG: CPT | Mod: CPTII,S$GLB,, | Performed by: UROLOGY

## 2022-09-30 RX ORDER — AMITRIPTYLINE HYDROCHLORIDE 10 MG/1
10 TABLET, FILM COATED ORAL NIGHTLY
Qty: 90 TABLET | Refills: 3 | Status: SHIPPED | OUTPATIENT
Start: 2022-09-30 | End: 2023-09-20

## 2022-09-30 NOTE — PROGRESS NOTES
CHIEF COMPLAINT:    Mrs. Call is a 62 y.o. female presenting for a follow up on IC.      PRESENTING ILLNESS:    Meaghan Call is a 62 y.o. female who states she has been doing well from the standpoint of the IC.  She continues on amitriptyline.  She has some vaginal dryness and discomfort with intercourse. She is on oral estradiol.     She was concerned about the cysts that were seen on her CT urogram from last year.  This was ordered by Esperanza Marcus for microscopic hematuria.  She never had the cystoscopy done.      REVIEW OF SYSTEMS:    Review of Systems   Constitutional: Negative.    HENT: Negative.     Eyes: Negative.    Respiratory: Negative.     Cardiovascular: Negative.    Gastrointestinal:  Positive for heartburn.   Genitourinary: Negative.    Musculoskeletal: Negative.    Skin: Negative.    Neurological: Negative.    Endo/Heme/Allergies: Negative.    Psychiatric/Behavioral: Negative.       PATIENT HISTORY:    Past Medical History:   Diagnosis Date    Acid reflux     Gastroparesis     Hypertension     Interstitial cystitis 2004    Interstitial cystitis     Kidney stone     Osteopenia        Past Surgical History:   Procedure Laterality Date    BREAST BIOPSY Left     BENIGN    CHOLECYSTECTOMY      CYSTOSCOPY      HYSTERECTOMY      lower lumbar surgery N/A 08/03/2021    MUSCLE BIPOSY Right     Thigh area    OOPHORECTOMY      TUBAL LIGATION         History reviewed. No pertinent family history.    Social History     Socioeconomic History    Marital status:    Tobacco Use    Smoking status: Never    Smokeless tobacco: Never   Substance and Sexual Activity    Alcohol use: No    Drug use: No    Sexual activity: Yes       Allergies:  Codeine    Medications:  Outpatient Encounter Medications as of 9/30/2022   Medication Sig Dispense Refill    amitriptyline (ELAVIL) 10 MG tablet Take 1 tablet (10 mg total) by mouth every evening. 90 tablet 3    amLODIPine (NORVASC) 2.5 MG tablet Take 2.5 mg  by mouth once daily.      CALCIUM CARBONATE (CALCIUM 500 ORAL) Take by mouth.      estradioL (ESTRACE) 2 MG tablet Take 2 mg by mouth once daily.      famotidine (PEPCID) 40 MG tablet Take 40 mg by mouth once daily.      propranolol (INDERAL) 10 MG tablet Take 10 mg by mouth every morning.       valsartan (DIOVAN) 160 MG tablet Take 160 mg by mouth once daily.      [DISCONTINUED] amitriptyline (ELAVIL) 10 MG tablet Take 1 tablet (10 mg total) by mouth every evening. 30 tablet 0     No facility-administered encounter medications on file as of 9/30/2022.         PHYSICAL EXAMINATION:    The patient generally appears in good health, is appropriately interactive, and is in no apparent distress.    Skin: No lesions.    Mental: Cooperative with normal affect.    Neuro: Grossly intact.    HEENT: Normal. No evidence of lymphadenopathy.    Chest:  normal inspiratory effort.    Abdomen:  Soft, non-tender. No masses or organomegaly. Bladder is not palpable. No evidence of flank discomfort. No evidence of inguinal hernia.    Extremities: No clubbing, cyanosis, or edema    Normal external female genitalia  Urethral meatus is normal  Urethra and bladder are nontender to bimanual exam  Well supported anteriorly and posteriorly   Uterus and cervix are normal  No adnexal masses    LABS:    Lab Results   Component Value Date    BUN 14 06/25/2022    CREATININE 0.78 06/25/2022     UA 1.015, pH 5, tr protein, 50 blood, otherwise, negative    CT urogram from 9/29/2021 was reviewed, images and report. The cysts are stable from 2017 and 2018.  Small septation on the cyst on the right side.  Stability speaks to the benign nature.     IMPRESSION:    Encounter Diagnoses   Name Primary?    Microhematuria Yes    Interstitial cystitis        PLAN:    1.  Refilled the amitriptyline  2.  Recommended Via for vaginal dryness, can use olive oil as a vaginal lubricant  3.  Cystoscopy, possible bladder biopsy and fulguration under sedation, given the  history of IC.  Consent signed.     I spent 40 minutes with the patient of which more than half was spent in direct consultation with the patient in regards to our treatment and plan.

## 2022-09-30 NOTE — PATIENT INSTRUCTIONS
May use olive oil as a vaginal lubricant.  I recommend keeping a separate bottle by the bedside to separate it from your salad oil so there is no contamination.  Do not use oil if using condoms.  Must use a water soluble lubricant.

## 2022-10-06 ENCOUNTER — TELEPHONE (OUTPATIENT)
Dept: UROLOGY | Facility: CLINIC | Age: 62
End: 2022-10-06
Payer: COMMERCIAL

## 2022-10-06 ENCOUNTER — PATIENT MESSAGE (OUTPATIENT)
Dept: SURGERY | Facility: HOSPITAL | Age: 62
End: 2022-10-06
Payer: COMMERCIAL

## 2022-10-06 NOTE — TELEPHONE ENCOUNTER
----- Message from Gilma Urbina sent at 10/6/2022  9:35 AM CDT -----  Regarding: Procedure 10/11/2022  Pt is requesting a call back regarding Rescheduling Procedure please call to discuss further .    PT@ [phone]

## 2022-10-10 NOTE — TELEPHONE ENCOUNTER
----- Message from Easton Massey sent at 10/10/2022 11:24 AM CDT -----  Contact: @[phone]  Pt is requesting a call back regarding rescheduling procedure 10/11, please call to discuss further . Pt states that she has been calling since last week and does not want to be charged for the procedure.

## 2022-10-10 NOTE — TELEPHONE ENCOUNTER
Called and got the patient's voice mail.  There is no charge for a procedure that has to be rescheduled.  I am not sure what she is referring to.  Looking through the chart, Villa called last week and the plan was for the patient to call back when she is ready to reschedule.

## 2022-10-13 ENCOUNTER — TELEPHONE (OUTPATIENT)
Dept: UROLOGY | Facility: CLINIC | Age: 62
End: 2022-10-13

## 2022-10-13 DIAGNOSIS — N28.1 RENAL CYSTS, ACQUIRED, BILATERAL: Primary | ICD-10-CM

## 2022-10-13 NOTE — TELEPHONE ENCOUNTER
Renal ultrasound from [facility] performed on 9/9/2022 showed cysts right and left kidney.  Right has septations minimal, measuring 2.3 cm.  Left was 1.1 cm in the upper pole.     Will plan a CT abd with and without contrast.

## 2023-04-17 ENCOUNTER — PATIENT MESSAGE (OUTPATIENT)
Dept: UROLOGY | Facility: CLINIC | Age: 63
End: 2023-04-17
Payer: COMMERCIAL

## 2023-08-02 ENCOUNTER — TELEPHONE (OUTPATIENT)
Dept: UROLOGY | Facility: CLINIC | Age: 63
End: 2023-08-02
Payer: COMMERCIAL

## 2023-08-02 NOTE — TELEPHONE ENCOUNTER
Spoke to the patient. She was advises to make an appointment with Dr. Booker. She is feels she may be having IC flare up. She stated she will bárbara the office once she looks at her personal schedule.    VIDAL Nielson

## 2023-08-11 ENCOUNTER — PATIENT MESSAGE (OUTPATIENT)
Dept: UROLOGY | Facility: CLINIC | Age: 63
End: 2023-08-11
Payer: COMMERCIAL

## 2023-08-11 NOTE — TELEPHONE ENCOUNTER
----- Message from Angelica Urbina sent at 8/11/2023  8:38 AM CDT -----  Regarding: appt access  Contact: pt [phone]  Pt calling to schedule appt for bladder pain. Pls call

## 2023-08-14 ENCOUNTER — OFFICE VISIT (OUTPATIENT)
Dept: UROLOGY | Facility: CLINIC | Age: 63
End: 2023-08-14
Payer: COMMERCIAL

## 2023-08-14 VITALS
HEIGHT: 61 IN | SYSTOLIC BLOOD PRESSURE: 123 MMHG | HEART RATE: 65 BPM | WEIGHT: 127.88 LBS | BODY MASS INDEX: 24.15 KG/M2 | DIASTOLIC BLOOD PRESSURE: 72 MMHG

## 2023-08-14 DIAGNOSIS — N28.1 RENAL CYSTS, ACQUIRED, BILATERAL: ICD-10-CM

## 2023-08-14 DIAGNOSIS — R31.29 MICROHEMATURIA: ICD-10-CM

## 2023-08-14 DIAGNOSIS — N30.10 INTERSTITIAL CYSTITIS: Primary | ICD-10-CM

## 2023-08-14 LAB
BILIRUB SERPL-MCNC: NORMAL MG/DL
BLOOD URINE, POC: NORMAL
CLARITY, POC UA: CLEAR
COLOR, POC UA: NORMAL
GLUCOSE UR QL STRIP: NORMAL
KETONES UR QL STRIP: NORMAL
LEUKOCYTE ESTERASE URINE, POC: NORMAL
NITRITE, POC UA: NORMAL
PH, POC UA: 9
PROTEIN, POC: NORMAL
SPECIFIC GRAVITY, POC UA: 1
UROBILINOGEN, POC UA: NORMAL

## 2023-08-14 PROCEDURE — 51700 PR IRRIGATION, BLADDER: ICD-10-PCS | Mod: S$GLB,,, | Performed by: NURSE PRACTITIONER

## 2023-08-14 PROCEDURE — 99215 PR OFFICE/OUTPT VISIT, EST, LEVL V, 40-54 MIN: ICD-10-PCS | Mod: 25,S$GLB,, | Performed by: NURSE PRACTITIONER

## 2023-08-14 PROCEDURE — 1160F PR REVIEW ALL MEDS BY PRESCRIBER/CLIN PHARMACIST DOCUMENTED: ICD-10-PCS | Mod: CPTII,S$GLB,, | Performed by: NURSE PRACTITIONER

## 2023-08-14 PROCEDURE — 3008F BODY MASS INDEX DOCD: CPT | Mod: CPTII,S$GLB,, | Performed by: NURSE PRACTITIONER

## 2023-08-14 PROCEDURE — 99999 PR PBB SHADOW E&M-EST. PATIENT-LVL III: CPT | Mod: PBBFAC,,, | Performed by: NURSE PRACTITIONER

## 2023-08-14 PROCEDURE — 99999 PR PBB SHADOW E&M-EST. PATIENT-LVL III: ICD-10-PCS | Mod: PBBFAC,,, | Performed by: NURSE PRACTITIONER

## 2023-08-14 PROCEDURE — 3008F PR BODY MASS INDEX (BMI) DOCUMENTED: ICD-10-PCS | Mod: CPTII,S$GLB,, | Performed by: NURSE PRACTITIONER

## 2023-08-14 PROCEDURE — 3078F DIAST BP <80 MM HG: CPT | Mod: CPTII,S$GLB,, | Performed by: NURSE PRACTITIONER

## 2023-08-14 PROCEDURE — 81002 URINALYSIS NONAUTO W/O SCOPE: CPT | Mod: S$GLB,,, | Performed by: NURSE PRACTITIONER

## 2023-08-14 PROCEDURE — 81002 POCT URINE DIPSTICK WITHOUT MICROSCOPE: ICD-10-PCS | Mod: S$GLB,,, | Performed by: NURSE PRACTITIONER

## 2023-08-14 PROCEDURE — 3074F SYST BP LT 130 MM HG: CPT | Mod: CPTII,S$GLB,, | Performed by: NURSE PRACTITIONER

## 2023-08-14 PROCEDURE — 1159F MED LIST DOCD IN RCRD: CPT | Mod: CPTII,S$GLB,, | Performed by: NURSE PRACTITIONER

## 2023-08-14 PROCEDURE — 4010F PR ACE/ARB THEARPY RXD/TAKEN: ICD-10-PCS | Mod: CPTII,S$GLB,, | Performed by: NURSE PRACTITIONER

## 2023-08-14 PROCEDURE — 1160F RVW MEDS BY RX/DR IN RCRD: CPT | Mod: CPTII,S$GLB,, | Performed by: NURSE PRACTITIONER

## 2023-08-14 PROCEDURE — 4010F ACE/ARB THERAPY RXD/TAKEN: CPT | Mod: CPTII,S$GLB,, | Performed by: NURSE PRACTITIONER

## 2023-08-14 PROCEDURE — 1159F PR MEDICATION LIST DOCUMENTED IN MEDICAL RECORD: ICD-10-PCS | Mod: CPTII,S$GLB,, | Performed by: NURSE PRACTITIONER

## 2023-08-14 PROCEDURE — 99215 OFFICE O/P EST HI 40 MIN: CPT | Mod: 25,S$GLB,, | Performed by: NURSE PRACTITIONER

## 2023-08-14 PROCEDURE — 51700 IRRIGATION OF BLADDER: CPT | Mod: S$GLB,,, | Performed by: NURSE PRACTITIONER

## 2023-08-14 PROCEDURE — 3078F PR MOST RECENT DIASTOLIC BLOOD PRESSURE < 80 MM HG: ICD-10-PCS | Mod: CPTII,S$GLB,, | Performed by: NURSE PRACTITIONER

## 2023-08-14 PROCEDURE — 3074F PR MOST RECENT SYSTOLIC BLOOD PRESSURE < 130 MM HG: ICD-10-PCS | Mod: CPTII,S$GLB,, | Performed by: NURSE PRACTITIONER

## 2023-08-14 RX ORDER — HEPARIN SODIUM 10000 [USP'U]/ML
10000 INJECTION, SOLUTION INTRAVENOUS; SUBCUTANEOUS
Status: COMPLETED | OUTPATIENT
Start: 2023-08-14 | End: 2023-08-14

## 2023-08-14 RX ORDER — LIDOCAINE HYDROCHLORIDE 10 MG/ML
20 INJECTION INFILTRATION; PERINEURAL
Status: COMPLETED | OUTPATIENT
Start: 2023-08-14 | End: 2023-08-14

## 2023-08-14 RX ORDER — SUCRALFATE 1 G/1
1 TABLET ORAL 2 TIMES DAILY
COMMUNITY
Start: 2023-04-12

## 2023-08-14 RX ORDER — VALSARTAN AND HYDROCHLOROTHIAZIDE 160; 12.5 MG/1; MG/1
TABLET, FILM COATED ORAL
COMMUNITY

## 2023-08-14 RX ORDER — BUPIVACAINE HYDROCHLORIDE 5 MG/ML
20 INJECTION, SOLUTION PERINEURAL
Status: COMPLETED | OUTPATIENT
Start: 2023-08-14 | End: 2023-08-14

## 2023-08-14 RX ADMIN — LIDOCAINE HYDROCHLORIDE 20 ML: 10 INJECTION INFILTRATION; PERINEURAL at 12:08

## 2023-08-14 RX ADMIN — HEPARIN SODIUM 10000 UNITS: 10000 INJECTION, SOLUTION INTRAVENOUS; SUBCUTANEOUS at 12:08

## 2023-08-14 RX ADMIN — BUPIVACAINE HYDROCHLORIDE 100 MG: 5 INJECTION, SOLUTION PERINEURAL at 12:08

## 2023-08-14 NOTE — PROGRESS NOTES
CHIEF COMPLAINT:    Mrs. Call is a 62 y.o. female presenting for ic flare up.    .  PRESENTING ILLNESS:    Meaghan Call is a 62 y.o. female with a PMH of htn, gerd, chronic back pain who presents for IC flare up.     Established patient to urology department. Presents today for IC flare up.  Pain started about 2 months ago, but would come and go.  Now for the past 2 weeks the pain has been constant. Pain intensity worsens at night.      She reports a good urinary stream and complete bladder emptying.    Had scheduled for CT scan and cystoscopy as work up for microscopic hematuria. These were canceled due to patient caring for her  after his shoulder surgery. Patient was previously scheduled for cystoscopy possible bladder biopsy and fulguration under sedation.       REVIEW OF SYSTEMS:    Review of Systems   Constitutional:  Negative for chills and fever.   Respiratory:  Negative for shortness of breath.    Cardiovascular:  Negative for chest pain.   Gastrointestinal:  Negative for constipation and diarrhea.   Genitourinary:  Negative for dysuria, flank pain, frequency, hematuria and urgency.   Neurological:  Negative for dizziness and weakness.        PATIENT HISTORY:    Past Medical History:   Diagnosis Date    Acid reflux     Gastroparesis     Hypertension     Interstitial cystitis 2004    Interstitial cystitis     Kidney stone     Osteopenia        No family history on file.    Allergies:  Codeine    Medications:    Current Outpatient Medications:     amitriptyline (ELAVIL) 10 MG tablet, Take 1 tablet (10 mg total) by mouth every evening., Disp: 90 tablet, Rfl: 3    amLODIPine (NORVASC) 2.5 MG tablet, Take 2.5 mg by mouth once daily., Disp: , Rfl:     CALCIUM CARBONATE (CALCIUM 500 ORAL), Take by mouth., Disp: , Rfl:     estradioL (ESTRACE) 2 MG tablet, Take 2 mg by mouth once daily., Disp: , Rfl:     famotidine (PEPCID) 40 MG tablet, Take 40 mg by mouth once daily., Disp: , Rfl:      propranolol (INDERAL) 10 MG tablet, Take 10 mg by mouth every morning. , Disp: , Rfl:     sucralfate (CARAFATE) 1 gram tablet, Take 1 g by mouth 2 (two) times daily., Disp: , Rfl:     valsartan (DIOVAN) 160 MG tablet, Take 160 mg by mouth once daily., Disp: , Rfl:     valsartan-hydrochlorothiazide (DIOVAN-HCT) 160-12.5 mg per tablet, valsartan 160 mg-hydrochlorothiazide 12.5 mg tablet  TAKE 1 TABLET BY MOUTH EVERY DAY, Disp: , Rfl:     PHYSICAL EXAMINATION:    Physical Exam  Vitals and nursing note reviewed. Exam conducted with a chaperone present.   Constitutional:       Appearance: Normal appearance. She is well-developed.   HENT:      Head: Normocephalic and atraumatic.   Eyes:      Pupils: Pupils are equal, round, and reactive to light.   Pulmonary:      Effort: Pulmonary effort is normal.   Genitourinary:     General: Normal vulva.      Exam position: Supine.      Urethra: No prolapse, urethral pain, urethral swelling or urethral lesion.      Vagina: Normal.      Rectum: Normal.   Musculoskeletal:         General: Normal range of motion.      Cervical back: Normal range of motion.   Skin:     General: Skin is warm and dry.   Neurological:      Mental Status: She is alert and oriented to person, place, and time.   Psychiatric:         Behavior: Behavior normal.       Consent verbally obtained.  Betadine prep was applied to the urethral meatus. An in and out cath was performed after voiding.  The PVR was 40 ml.        LABS:    U/a dipstick performed in office today: yellow, ph 9, 1.000, trace bld    IMPRESSION:    Encounter Diagnoses   Name Primary?    Interstitial cystitis Yes    Microhematuria     Renal cysts, acquired, bilateral        PLAN:    Problem List Items Addressed This Visit       Interstitial cystitis - Primary    Relevant Orders    POCT URINE DIPSTICK WITHOUT MICROSCOPE (Completed)     Other Visit Diagnoses       Microhematuria        Renal cysts, acquired, bilateral                1. Interstitial  cystitis   Pt here to receive bladder installation lidocaine, marcaine, solucortef, and heparin. Allergies reviewed, 12 Fr catheter inserted without difficulty, bladder installation instilled, pt tolerated well remained in room for 20 minutes, then emptied bladder, escorted to lobby.  Continue amitriptyline    2. History of microscopic hematuria/bilateral renal cyst  Speak with dr. Booker on reschedule cystoscopy and imaging    3. Rtc based on discussion       Mirian Becerra NP        I spent over 45 minutes with the patient. Over 50% of the visit was spent in counseling.

## 2023-08-15 ENCOUNTER — PATIENT MESSAGE (OUTPATIENT)
Dept: UROLOGY | Facility: CLINIC | Age: 63
End: 2023-08-15
Payer: COMMERCIAL

## 2023-08-17 ENCOUNTER — TELEPHONE (OUTPATIENT)
Dept: UROLOGY | Facility: CLINIC | Age: 63
End: 2023-08-17
Payer: COMMERCIAL

## 2023-08-17 ENCOUNTER — LAB VISIT (OUTPATIENT)
Dept: LAB | Facility: HOSPITAL | Age: 63
End: 2023-08-17
Attending: NURSE PRACTITIONER
Payer: COMMERCIAL

## 2023-08-17 DIAGNOSIS — R30.0 DYSURIA: Primary | ICD-10-CM

## 2023-08-17 DIAGNOSIS — R30.0 DYSURIA: ICD-10-CM

## 2023-08-17 LAB
BACTERIA #/AREA URNS HPF: ABNORMAL /HPF
MICROSCOPIC COMMENT: ABNORMAL
RBC #/AREA URNS HPF: 5 /HPF (ref 0–4)
SQUAMOUS #/AREA URNS HPF: 3 /HPF
WBC #/AREA URNS HPF: 30 /HPF (ref 0–5)

## 2023-08-17 PROCEDURE — 81000 URINALYSIS NONAUTO W/SCOPE: CPT | Performed by: NURSE PRACTITIONER

## 2023-08-17 PROCEDURE — 87088 URINE BACTERIA CULTURE: CPT | Performed by: NURSE PRACTITIONER

## 2023-08-17 PROCEDURE — 87086 URINE CULTURE/COLONY COUNT: CPT | Performed by: NURSE PRACTITIONER

## 2023-08-17 NOTE — TELEPHONE ENCOUNTER
Pt states she had a great deal of burning for a few days after instillation, beginning to get better. Will go to [place] to submit a urine specimen. Has been taking AZO a couple of times over the past 2 days.

## 2023-08-17 NOTE — TELEPHONE ENCOUNTER
Pt sent duplicate messages to Dr. Booker's staff and NP, Mirian Becerra's staff. Message handled by NP for medication request for dysuria.

## 2023-08-18 ENCOUNTER — PATIENT MESSAGE (OUTPATIENT)
Dept: UROLOGY | Facility: CLINIC | Age: 63
End: 2023-08-18
Payer: COMMERCIAL

## 2023-08-18 DIAGNOSIS — R31.29 MICROHEMATURIA: Primary | ICD-10-CM

## 2023-08-18 LAB — BACTERIA UR CULT: ABNORMAL

## 2023-08-18 RX ORDER — CIPROFLOXACIN 500 MG/1
500 TABLET ORAL EVERY 12 HOURS
Qty: 14 TABLET | Refills: 0 | Status: SHIPPED | OUTPATIENT
Start: 2023-08-18 | End: 2023-08-25

## 2023-08-18 RX ORDER — CIPROFLOXACIN 500 MG/1
500 TABLET ORAL ONCE
Status: CANCELLED | OUTPATIENT
Start: 2023-08-18 | End: 2023-08-18

## 2023-08-18 RX ORDER — LIDOCAINE HYDROCHLORIDE 20 MG/ML
JELLY TOPICAL ONCE
Status: CANCELLED | OUTPATIENT
Start: 2023-08-18 | End: 2023-08-18

## 2023-08-22 ENCOUNTER — TELEPHONE (OUTPATIENT)
Dept: UROLOGY | Facility: CLINIC | Age: 63
End: 2023-08-22
Payer: COMMERCIAL

## 2023-08-22 RX ORDER — DOXYCYCLINE 100 MG/1
100 CAPSULE ORAL 2 TIMES DAILY
Qty: 14 CAPSULE | Refills: 0 | Status: SHIPPED | OUTPATIENT
Start: 2023-08-22 | End: 2023-08-29

## 2023-08-22 NOTE — TELEPHONE ENCOUNTER
Patient empirically treated with course of cipro due to suspected uti.  Urine culture with coag staph, which is likely not susceptible to cipro.  Patient continues to have uti symptoms, will change antibiotic to doxycycline.

## 2023-08-25 ENCOUNTER — TELEPHONE (OUTPATIENT)
Dept: UROLOGY | Facility: CLINIC | Age: 63
End: 2023-08-25
Payer: COMMERCIAL

## 2023-08-25 ENCOUNTER — OFFICE VISIT (OUTPATIENT)
Dept: UROLOGY | Facility: CLINIC | Age: 63
End: 2023-08-25
Payer: COMMERCIAL

## 2023-08-25 ENCOUNTER — PATIENT MESSAGE (OUTPATIENT)
Dept: UROLOGY | Facility: CLINIC | Age: 63
End: 2023-08-25
Payer: COMMERCIAL

## 2023-08-25 ENCOUNTER — HOSPITAL ENCOUNTER (OUTPATIENT)
Dept: RADIOLOGY | Facility: HOSPITAL | Age: 63
Discharge: HOME | End: 2023-08-25
Attending: NURSE PRACTITIONER
Payer: COMMERCIAL

## 2023-08-25 VITALS
HEIGHT: 61 IN | WEIGHT: 122.81 LBS | DIASTOLIC BLOOD PRESSURE: 74 MMHG | SYSTOLIC BLOOD PRESSURE: 131 MMHG | BODY MASS INDEX: 23.19 KG/M2 | HEART RATE: 83 BPM

## 2023-08-25 DIAGNOSIS — R31.29 MICROHEMATURIA: ICD-10-CM

## 2023-08-25 DIAGNOSIS — N30.10 INTERSTITIAL CYSTITIS: Primary | ICD-10-CM

## 2023-08-25 DIAGNOSIS — N39.0 URINARY TRACT INFECTION WITHOUT HEMATURIA, SITE UNSPECIFIED: ICD-10-CM

## 2023-08-25 DIAGNOSIS — R30.0 DYSURIA: ICD-10-CM

## 2023-08-25 LAB
BILIRUB SERPL-MCNC: NORMAL MG/DL
BLOOD URINE, POC: NORMAL
CLARITY, POC UA: CLEAR
COLOR, POC UA: YELLOW
GLUCOSE UR QL STRIP: NORMAL
KETONES UR QL STRIP: NORMAL
LEUKOCYTE ESTERASE URINE, POC: NORMAL
NITRITE, POC UA: NORMAL
PH, POC UA: 5
PROTEIN, POC: NORMAL
SPECIFIC GRAVITY, POC UA: 1010
UROBILINOGEN, POC UA: NORMAL

## 2023-08-25 PROCEDURE — 51700 IRRIGATION OF BLADDER: CPT | Mod: S$GLB,,, | Performed by: NURSE PRACTITIONER

## 2023-08-25 PROCEDURE — 3008F BODY MASS INDEX DOCD: CPT | Mod: CPTII,S$GLB,, | Performed by: NURSE PRACTITIONER

## 2023-08-25 PROCEDURE — 74178 CT ABD&PLV WO CNTR FLWD CNTR: CPT | Mod: TC

## 2023-08-25 PROCEDURE — 81002 POCT URINE DIPSTICK WITHOUT MICROSCOPE: ICD-10-PCS | Mod: S$GLB,,, | Performed by: NURSE PRACTITIONER

## 2023-08-25 PROCEDURE — 3075F SYST BP GE 130 - 139MM HG: CPT | Mod: CPTII,S$GLB,, | Performed by: NURSE PRACTITIONER

## 2023-08-25 PROCEDURE — 3075F PR MOST RECENT SYSTOLIC BLOOD PRESS GE 130-139MM HG: ICD-10-PCS | Mod: CPTII,S$GLB,, | Performed by: NURSE PRACTITIONER

## 2023-08-25 PROCEDURE — 74178 CT ABD&PLV WO CNTR FLWD CNTR: CPT | Mod: 26,,, | Performed by: RADIOLOGY

## 2023-08-25 PROCEDURE — 1159F PR MEDICATION LIST DOCUMENTED IN MEDICAL RECORD: ICD-10-PCS | Mod: CPTII,S$GLB,, | Performed by: NURSE PRACTITIONER

## 2023-08-25 PROCEDURE — 4010F ACE/ARB THERAPY RXD/TAKEN: CPT | Mod: CPTII,S$GLB,, | Performed by: NURSE PRACTITIONER

## 2023-08-25 PROCEDURE — 99999 PR PBB SHADOW E&M-EST. PATIENT-LVL III: CPT | Mod: PBBFAC,,, | Performed by: NURSE PRACTITIONER

## 2023-08-25 PROCEDURE — 25500020 PHARM REV CODE 255: Performed by: NURSE PRACTITIONER

## 2023-08-25 PROCEDURE — 3008F PR BODY MASS INDEX (BMI) DOCUMENTED: ICD-10-PCS | Mod: CPTII,S$GLB,, | Performed by: NURSE PRACTITIONER

## 2023-08-25 PROCEDURE — 1160F RVW MEDS BY RX/DR IN RCRD: CPT | Mod: CPTII,S$GLB,, | Performed by: NURSE PRACTITIONER

## 2023-08-25 PROCEDURE — 99499 UNLISTED E&M SERVICE: CPT | Mod: S$GLB,,, | Performed by: NURSE PRACTITIONER

## 2023-08-25 PROCEDURE — 81002 URINALYSIS NONAUTO W/O SCOPE: CPT | Mod: S$GLB,,, | Performed by: NURSE PRACTITIONER

## 2023-08-25 PROCEDURE — 99999 PR PBB SHADOW E&M-EST. PATIENT-LVL III: ICD-10-PCS | Mod: PBBFAC,,, | Performed by: NURSE PRACTITIONER

## 2023-08-25 PROCEDURE — 3078F PR MOST RECENT DIASTOLIC BLOOD PRESSURE < 80 MM HG: ICD-10-PCS | Mod: CPTII,S$GLB,, | Performed by: NURSE PRACTITIONER

## 2023-08-25 PROCEDURE — 51700 PR IRRIGATION, BLADDER: ICD-10-PCS | Mod: S$GLB,,, | Performed by: NURSE PRACTITIONER

## 2023-08-25 PROCEDURE — 99499 NO LOS: ICD-10-PCS | Mod: S$GLB,,, | Performed by: NURSE PRACTITIONER

## 2023-08-25 PROCEDURE — 1159F MED LIST DOCD IN RCRD: CPT | Mod: CPTII,S$GLB,, | Performed by: NURSE PRACTITIONER

## 2023-08-25 PROCEDURE — 3078F DIAST BP <80 MM HG: CPT | Mod: CPTII,S$GLB,, | Performed by: NURSE PRACTITIONER

## 2023-08-25 PROCEDURE — 1160F PR REVIEW ALL MEDS BY PRESCRIBER/CLIN PHARMACIST DOCUMENTED: ICD-10-PCS | Mod: CPTII,S$GLB,, | Performed by: NURSE PRACTITIONER

## 2023-08-25 PROCEDURE — 4010F PR ACE/ARB THEARPY RXD/TAKEN: ICD-10-PCS | Mod: CPTII,S$GLB,, | Performed by: NURSE PRACTITIONER

## 2023-08-25 PROCEDURE — 74178 CT UROGRAM ABD PELVIS W WO: ICD-10-PCS | Mod: 26,,, | Performed by: RADIOLOGY

## 2023-08-25 RX ORDER — HEPARIN SODIUM 10000 [USP'U]/ML
20000 INJECTION, SOLUTION INTRAVENOUS; SUBCUTANEOUS
Status: COMPLETED | OUTPATIENT
Start: 2023-08-25 | End: 2023-08-25

## 2023-08-25 RX ORDER — LIDOCAINE HYDROCHLORIDE 10 MG/ML
20 INJECTION INFILTRATION; PERINEURAL
Status: COMPLETED | OUTPATIENT
Start: 2023-08-25 | End: 2023-08-25

## 2023-08-25 RX ORDER — BUPIVACAINE HYDROCHLORIDE 5 MG/ML
20 INJECTION, SOLUTION PERINEURAL
Status: COMPLETED | OUTPATIENT
Start: 2023-08-25 | End: 2023-08-25

## 2023-08-25 RX ORDER — GENTAMICIN SULFATE 40 MG/ML
80 INJECTION, SOLUTION INTRAMUSCULAR; INTRAVENOUS ONCE
Status: COMPLETED | OUTPATIENT
Start: 2023-08-25 | End: 2023-08-25

## 2023-08-25 RX ADMIN — LIDOCAINE HYDROCHLORIDE 20 ML: 10 INJECTION INFILTRATION; PERINEURAL at 04:08

## 2023-08-25 RX ADMIN — GENTAMICIN SULFATE 80 MG: 40 INJECTION, SOLUTION INTRAMUSCULAR; INTRAVENOUS at 04:08

## 2023-08-25 RX ADMIN — IOHEXOL 131 ML: 350 INJECTION, SOLUTION INTRAVENOUS at 11:08

## 2023-08-25 RX ADMIN — HEPARIN SODIUM 20000 UNITS: 10000 INJECTION, SOLUTION INTRAVENOUS; SUBCUTANEOUS at 04:08

## 2023-08-25 RX ADMIN — BUPIVACAINE HYDROCHLORIDE 100 MG: 5 INJECTION, SOLUTION PERINEURAL at 04:08

## 2023-08-25 NOTE — PROGRESS NOTES
CHIEF COMPLAINT:    Mrs. Call is a 62 y.o. female presenting for ic flare up with infection.    .  PRESENTING ILLNESS:    Meaghan Call is a 62 y.o. female with a PMH of htn, gerd, chronic back pain who presents for IC flare up with infection.     Established patient to urology department. Presents today for IC flare up persistent with infection.  Last seen 8/14/23 and treated IC flare with bladder cocktail.  Office urine dipstick not indicative of infection.  However patient developed worsening symptoms after cocktail.  A ua and culture sent that revealed coag neg staph infection.  Patient placed on antibiotic for treatment.  Symptoms did improve with antibiotic, however pain still lingering.  Will plan for another bladder cocktail with gentamicin instead of solucortef.    Had scheduled for CT scan and cystoscopy as work up for microscopic hematuria. These were canceled due to patient caring for her  after his shoulder surgery. Obtained CT urogram 8/25/23 that was reviewed.  Stable bilateral cysts noted.  Will plan for cystoscopy possible bladder biopsy and fulguration under sedation per Dr. Booker last note.       REVIEW OF SYSTEMS:    Review of Systems   Constitutional:  Negative for chills and fever.   Respiratory:  Negative for shortness of breath.    Cardiovascular:  Negative for chest pain.   Gastrointestinal:  Negative for constipation and diarrhea.   Genitourinary:  Negative for dysuria, flank pain, frequency, hematuria and urgency.   Neurological:  Negative for dizziness and weakness.        PATIENT HISTORY:    Past Medical History:   Diagnosis Date    Acid reflux     Gastroparesis     Hypertension     Interstitial cystitis 2004    Interstitial cystitis     Kidney stone     Osteopenia        History reviewed. No pertinent family history.    Allergies:  Codeine    Medications:    Current Outpatient Medications:     amitriptyline (ELAVIL) 10 MG tablet, Take 1 tablet (10 mg total) by  mouth every evening., Disp: 90 tablet, Rfl: 3    amLODIPine (NORVASC) 2.5 MG tablet, Take 2.5 mg by mouth once daily., Disp: , Rfl:     CALCIUM CARBONATE (CALCIUM 500 ORAL), Take by mouth., Disp: , Rfl:     doxycycline (VIBRAMYCIN) 100 MG Cap, Take 1 capsule (100 mg total) by mouth 2 (two) times daily. for 7 days, Disp: 14 capsule, Rfl: 0    estradioL (ESTRACE) 2 MG tablet, Take 2 mg by mouth once daily., Disp: , Rfl:     famotidine (PEPCID) 40 MG tablet, Take 40 mg by mouth once daily., Disp: , Rfl:     propranolol (INDERAL) 10 MG tablet, Take 10 mg by mouth every morning. , Disp: , Rfl:     sucralfate (CARAFATE) 1 gram tablet, Take 1 g by mouth 2 (two) times daily., Disp: , Rfl:     valsartan (DIOVAN) 160 MG tablet, Take 160 mg by mouth once daily., Disp: , Rfl:     valsartan-hydrochlorothiazide (DIOVAN-HCT) 160-12.5 mg per tablet, valsartan 160 mg-hydrochlorothiazide 12.5 mg tablet  TAKE 1 TABLET BY MOUTH EVERY DAY, Disp: , Rfl:     PHYSICAL EXAMINATION:    Physical Exam  Vitals and nursing note reviewed. Exam conducted with a chaperone present.   Constitutional:       Appearance: Normal appearance. She is well-developed.   HENT:      Head: Normocephalic and atraumatic.   Eyes:      Pupils: Pupils are equal, round, and reactive to light.   Pulmonary:      Effort: Pulmonary effort is normal.   Genitourinary:     General: Normal vulva.      Exam position: Supine.      Urethra: No prolapse, urethral pain, urethral swelling or urethral lesion.      Vagina: Normal.      Rectum: Normal.   Musculoskeletal:         General: Normal range of motion.      Cervical back: Normal range of motion.   Skin:     General: Skin is warm and dry.   Neurological:      Mental Status: She is alert and oriented to person, place, and time.   Psychiatric:         Behavior: Behavior normal.       Consent verbally obtained.  Betadine prep was applied to the urethral meatus. An in and out cath was performed after voiding.  The PVR was 40 ml.         LABS:    U/a dipstick performed in office today: yellow, ph 5, 1.010, trace bld    IMPRESSION:    Encounter Diagnoses   Name Primary?    Interstitial cystitis Yes    Microhematuria     Urinary tract infection without hematuria, site unspecified     Dysuria      CT urogram 8/25/23:  Impression:     No nephrolithiasis, ureterolithiasis, hydronephrosis or hydroureter is seen.  No focal filling defect is identified within the well opacified portions of the bilateral renal or ureteral collecting systems.     Bilateral renal cysts.     Constipation.    PLAN:    Problem List Items Addressed This Visit       Interstitial cystitis - Primary    Relevant Orders    POCT URINE DIPSTICK WITHOUT MICROSCOPE (Completed)     Other Visit Diagnoses       Microhematuria        Urinary tract infection without hematuria, site unspecified        Dysuria                  1. Interstitial cystitis   Pt here to receive bladder installation lidocaine, marcaine, gentamicin and heparin. Allergies reviewed, 12 Fr catheter inserted without difficulty, bladder installation instilled, pt tolerated well remained in room for 20 minutes, then emptied bladder, escorted to lobby.  Continue amitriptyline    2. Current uti  Continue antibiotics prescribed  3.  History of microscopic hematuria/bilateral renal cyst   Reviewed CT urogram  Speak with Dr. Booker on reschedule cystoscopy    4. Rtc for f/u with Ade Becerra NP        I spent over 45 minutes with the patient. Over 50% of the visit was spent in counseling.

## 2023-08-31 ENCOUNTER — PATIENT MESSAGE (OUTPATIENT)
Dept: UROLOGY | Facility: CLINIC | Age: 63
End: 2023-08-31
Payer: COMMERCIAL

## 2023-08-31 RX ORDER — FLUCONAZOLE 150 MG/1
150 TABLET ORAL DAILY
Qty: 1 TABLET | Refills: 0 | Status: SHIPPED | OUTPATIENT
Start: 2023-08-31 | End: 2023-09-01

## 2023-09-14 DIAGNOSIS — N30.10 INTERSTITIAL CYSTITIS: ICD-10-CM

## 2023-09-20 RX ORDER — AMITRIPTYLINE HYDROCHLORIDE 10 MG/1
10 TABLET, FILM COATED ORAL NIGHTLY
Qty: 90 TABLET | Refills: 3 | Status: SHIPPED | OUTPATIENT
Start: 2023-09-20

## 2024-07-16 ENCOUNTER — OFFICE VISIT (OUTPATIENT)
Dept: UROLOGY | Facility: CLINIC | Age: 64
End: 2024-07-16
Payer: COMMERCIAL

## 2024-07-16 ENCOUNTER — TELEPHONE (OUTPATIENT)
Dept: UROLOGY | Facility: CLINIC | Age: 64
End: 2024-07-16

## 2024-07-16 VITALS
BODY MASS INDEX: 24.66 KG/M2 | SYSTOLIC BLOOD PRESSURE: 132 MMHG | DIASTOLIC BLOOD PRESSURE: 84 MMHG | WEIGHT: 130.5 LBS | HEART RATE: 63 BPM

## 2024-07-16 DIAGNOSIS — N30.10 INTERSTITIAL CYSTITIS: Primary | ICD-10-CM

## 2024-07-16 DIAGNOSIS — R31.29 MICROHEMATURIA: ICD-10-CM

## 2024-07-16 PROCEDURE — 4010F ACE/ARB THERAPY RXD/TAKEN: CPT | Mod: CPTII,S$GLB,, | Performed by: UROLOGY

## 2024-07-16 PROCEDURE — 99999 PR PBB SHADOW E&M-EST. PATIENT-LVL III: CPT | Mod: PBBFAC,,, | Performed by: UROLOGY

## 2024-07-16 PROCEDURE — 3008F BODY MASS INDEX DOCD: CPT | Mod: CPTII,S$GLB,, | Performed by: UROLOGY

## 2024-07-16 PROCEDURE — 3075F SYST BP GE 130 - 139MM HG: CPT | Mod: CPTII,S$GLB,, | Performed by: UROLOGY

## 2024-07-16 PROCEDURE — 1159F MED LIST DOCD IN RCRD: CPT | Mod: CPTII,S$GLB,, | Performed by: UROLOGY

## 2024-07-16 PROCEDURE — 99214 OFFICE O/P EST MOD 30 MIN: CPT | Mod: S$GLB,,, | Performed by: UROLOGY

## 2024-07-16 PROCEDURE — 3079F DIAST BP 80-89 MM HG: CPT | Mod: CPTII,S$GLB,, | Performed by: UROLOGY

## 2024-07-16 NOTE — PROGRESS NOTES
CHIEF COMPLAINT:    Mrs. Call is a 63 y.o. female presenting for a follow up on interstitial cystitis.    PRESENTING ILLNESS:    Meaghan Call is a 63 y.o. female who presents with a history of interstitial cystitis.  She was noted to have many flares last year.  She states that her mother has dementia and went into a nursing home.  Her father went with her to help care for her.  However, he had prostate cancer and passed away over Easter 2024.  Her mother lives 3.5 hours away by car.  She takes turns with her sibblings being with her but Mrs. Call goes disproportionately more.  All of this weighs on her and has increased her stress.    She states she used to journal, she prays.  However, she has had a hard time taking time for herself.  She has difficulty sleeping.     The flares are in the form of pain.  She has not seen any gross hematuria but she has microscopic hematuria.  Her last cystoscopy was in 2017.  Her last upper tract study was a CT urogram on 8/25/2023 which was negative (she had a prior CT urogram 9/17/2021)    REVIEW OF SYSTEMS:    Review of Systems   Constitutional:  Positive for malaise/fatigue.   HENT: Negative.     Eyes: Negative.    Respiratory: Negative.     Cardiovascular: Negative.    Gastrointestinal: Negative.    Genitourinary:         Dysuria when she has a flare   Musculoskeletal: Negative.    Skin: Negative.    Neurological: Negative.    Endo/Heme/Allergies: Negative.    Psychiatric/Behavioral:  The patient is nervous/anxious.         Difficulty sleeping       PATIENT HISTORY:    Past Medical History:   Diagnosis Date    Acid reflux     Gastroparesis     Hypertension     Interstitial cystitis 2004    Interstitial cystitis     Kidney stone     Osteopenia        Past Surgical History:   Procedure Laterality Date    BREAST BIOPSY Left     BENIGN    CHOLECYSTECTOMY      CYSTOSCOPY      HYSTERECTOMY      lower lumbar surgery N/A 08/03/2021    MUSCLE BIPOSY Right     Thigh  area    OOPHORECTOMY      TUBAL LIGATION         No family history on file.    Social History     Socioeconomic History    Marital status:    Tobacco Use    Smoking status: Never    Smokeless tobacco: Never   Substance and Sexual Activity    Alcohol use: No    Drug use: No    Sexual activity: Yes       Allergies:  Codeine    Medications:  Outpatient Encounter Medications as of 7/16/2024   Medication Sig Dispense Refill    amitriptyline (ELAVIL) 10 MG tablet TAKE 1 TABLET BY MOUTH EVERY DAY IN THE EVENING 90 tablet 3    amLODIPine (NORVASC) 2.5 MG tablet Take 2.5 mg by mouth once daily.      CALCIUM CARBONATE (CALCIUM 500 ORAL) Take by mouth.      estradioL (ESTRACE) 2 MG tablet Take 2 mg by mouth once daily.      famotidine (PEPCID) 40 MG tablet Take 40 mg by mouth once daily.      propranolol (INDERAL) 10 MG tablet Take 10 mg by mouth every morning.       sucralfate (CARAFATE) 1 gram tablet Take 1 g by mouth 2 (two) times daily.      valsartan (DIOVAN) 160 MG tablet Take 160 mg by mouth once daily.      valsartan-hydrochlorothiazide (DIOVAN-HCT) 160-12.5 mg per tablet valsartan 160 mg-hydrochlorothiazide 12.5 mg tablet   TAKE 1 TABLET BY MOUTH EVERY DAY       No facility-administered encounter medications on file as of 7/16/2024.         PHYSICAL EXAMINATION:    The patient generally appears in good health, is appropriately interactive, and is in no apparent distress.    Skin: No lesions.    Mental: Cooperative with normal affect.    Neuro: Grossly intact.    HEENT: Normal. No evidence of lymphadenopathy.    Chest:  normal inspiratory effort.    Extremities: No clubbing, cyanosis, or edema      LABS:    Lab Results   Component Value Date    BUN 14 06/25/2022    CREATININE 0.8 08/25/2023       UA 1.010, pH 6, tr protein, tr blood, otherwise, negative.     IMPRESSION:    Interstitial cystitis  Microscopic hematuria    PLAN:    1.  Discussed sleep strategies.    2.  Scheduled for cystoscopy, possible bladder  biopsy and fulguration.  Exam under anesthesia.  Consent signed.     I spent 30 minutes with the patient of which more than half was spent in direct consultation with the patient in regards to our treatment and plan.

## 2024-07-31 ENCOUNTER — ANESTHESIA (OUTPATIENT)
Dept: SURGERY | Facility: HOSPITAL | Age: 64
End: 2024-07-31
Payer: COMMERCIAL

## 2024-07-31 ENCOUNTER — HOSPITAL ENCOUNTER (OUTPATIENT)
Facility: HOSPITAL | Age: 64
Discharge: HOME | End: 2024-07-31
Attending: UROLOGY | Admitting: UROLOGY
Payer: COMMERCIAL

## 2024-07-31 ENCOUNTER — ANESTHESIA EVENT (OUTPATIENT)
Dept: SURGERY | Facility: HOSPITAL | Age: 64
End: 2024-07-31
Payer: COMMERCIAL

## 2024-07-31 VITALS
OXYGEN SATURATION: 99 % | DIASTOLIC BLOOD PRESSURE: 76 MMHG | TEMPERATURE: 98 F | SYSTOLIC BLOOD PRESSURE: 146 MMHG | RESPIRATION RATE: 18 BRPM | BODY MASS INDEX: 24.55 KG/M2 | HEIGHT: 61 IN | WEIGHT: 130 LBS | HEART RATE: 61 BPM

## 2024-07-31 DIAGNOSIS — N30.10 INTERSTITIAL CYSTITIS: ICD-10-CM

## 2024-07-31 PROCEDURE — 37000009 HC ANESTHESIA EA ADD 15 MINS: Performed by: UROLOGY

## 2024-07-31 PROCEDURE — 63600175 PHARM REV CODE 636 W HCPCS: Performed by: NURSE ANESTHETIST, CERTIFIED REGISTERED

## 2024-07-31 PROCEDURE — 25000003 PHARM REV CODE 250: Performed by: NURSE ANESTHETIST, CERTIFIED REGISTERED

## 2024-07-31 PROCEDURE — 36000706: Performed by: UROLOGY

## 2024-07-31 PROCEDURE — 36000707: Performed by: UROLOGY

## 2024-07-31 PROCEDURE — 99900035 HC TECH TIME PER 15 MIN (STAT)

## 2024-07-31 PROCEDURE — 71000033 HC RECOVERY, INTIAL HOUR: Performed by: UROLOGY

## 2024-07-31 PROCEDURE — 94761 N-INVAS EAR/PLS OXIMETRY MLT: CPT

## 2024-07-31 PROCEDURE — 37000008 HC ANESTHESIA 1ST 15 MINUTES: Performed by: UROLOGY

## 2024-07-31 PROCEDURE — 52000 CYSTOURETHROSCOPY: CPT | Mod: ,,, | Performed by: UROLOGY

## 2024-07-31 PROCEDURE — 71000015 HC POSTOP RECOV 1ST HR: Performed by: UROLOGY

## 2024-07-31 RX ORDER — HYDROMORPHONE HYDROCHLORIDE 1 MG/ML
0.2 INJECTION, SOLUTION INTRAMUSCULAR; INTRAVENOUS; SUBCUTANEOUS EVERY 5 MIN PRN
Status: DISCONTINUED | OUTPATIENT
Start: 2024-07-31 | End: 2024-07-31 | Stop reason: HOSPADM

## 2024-07-31 RX ORDER — ONDANSETRON HYDROCHLORIDE 2 MG/ML
4 INJECTION, SOLUTION INTRAVENOUS DAILY PRN
Status: DISCONTINUED | OUTPATIENT
Start: 2024-07-31 | End: 2024-07-31 | Stop reason: HOSPADM

## 2024-07-31 RX ORDER — MIDAZOLAM HYDROCHLORIDE 1 MG/ML
INJECTION INTRAMUSCULAR; INTRAVENOUS
Status: DISCONTINUED | OUTPATIENT
Start: 2024-07-31 | End: 2024-07-31

## 2024-07-31 RX ORDER — LIDOCAINE HYDROCHLORIDE 20 MG/ML
INJECTION INTRAVENOUS
Status: DISCONTINUED | OUTPATIENT
Start: 2024-07-31 | End: 2024-07-31

## 2024-07-31 RX ORDER — OXYCODONE AND ACETAMINOPHEN 5; 325 MG/1; MG/1
1 TABLET ORAL
Status: DISCONTINUED | OUTPATIENT
Start: 2024-07-31 | End: 2024-07-31 | Stop reason: HOSPADM

## 2024-07-31 RX ORDER — FENTANYL CITRATE 50 UG/ML
INJECTION, SOLUTION INTRAMUSCULAR; INTRAVENOUS
Status: DISCONTINUED | OUTPATIENT
Start: 2024-07-31 | End: 2024-07-31

## 2024-07-31 RX ORDER — GLUCAGON 1 MG
1 KIT INJECTION
Status: DISCONTINUED | OUTPATIENT
Start: 2024-07-31 | End: 2024-07-31 | Stop reason: HOSPADM

## 2024-07-31 RX ORDER — PROPOFOL 10 MG/ML
VIAL (ML) INTRAVENOUS CONTINUOUS PRN
Status: DISCONTINUED | OUTPATIENT
Start: 2024-07-31 | End: 2024-07-31

## 2024-07-31 RX ORDER — ONDANSETRON HYDROCHLORIDE 2 MG/ML
INJECTION, SOLUTION INTRAVENOUS
Status: DISCONTINUED | OUTPATIENT
Start: 2024-07-31 | End: 2024-07-31

## 2024-07-31 RX ORDER — PROCHLORPERAZINE EDISYLATE 5 MG/ML
5 INJECTION INTRAMUSCULAR; INTRAVENOUS EVERY 30 MIN PRN
Status: DISCONTINUED | OUTPATIENT
Start: 2024-07-31 | End: 2024-07-31 | Stop reason: HOSPADM

## 2024-07-31 RX ORDER — FENTANYL CITRATE 50 UG/ML
25 INJECTION, SOLUTION INTRAMUSCULAR; INTRAVENOUS EVERY 5 MIN PRN
Status: DISCONTINUED | OUTPATIENT
Start: 2024-07-31 | End: 2024-07-31 | Stop reason: HOSPADM

## 2024-07-31 RX ORDER — CEFAZOLIN SODIUM 1 G/3ML
INJECTION, POWDER, FOR SOLUTION INTRAMUSCULAR; INTRAVENOUS
Status: DISCONTINUED | OUTPATIENT
Start: 2024-07-31 | End: 2024-07-31

## 2024-07-31 RX ADMIN — ONDANSETRON 4 MG: 2 INJECTION INTRAMUSCULAR; INTRAVENOUS at 01:07

## 2024-07-31 RX ADMIN — FENTANYL CITRATE 25 MCG: 0.05 INJECTION, SOLUTION INTRAMUSCULAR; INTRAVENOUS at 01:07

## 2024-07-31 RX ADMIN — PROPOFOL 130 MCG/KG/MIN: 10 INJECTION, EMULSION INTRAVENOUS at 01:07

## 2024-07-31 RX ADMIN — GLYCOPYRROLATE 0.1 MG: 0.2 INJECTION, SOLUTION INTRAMUSCULAR; INTRAVENOUS at 01:07

## 2024-07-31 RX ADMIN — MIDAZOLAM HYDROCHLORIDE 2 MG: 1 INJECTION, SOLUTION INTRAMUSCULAR; INTRAVENOUS at 01:07

## 2024-07-31 RX ADMIN — PROPOFOL 40 MG: 10 INJECTION, EMULSION INTRAVENOUS at 01:07

## 2024-07-31 RX ADMIN — SODIUM CHLORIDE: 0.9 INJECTION, SOLUTION INTRAVENOUS at 12:07

## 2024-07-31 RX ADMIN — LIDOCAINE HYDROCHLORIDE 50 MG: 20 INJECTION INTRAVENOUS at 01:07

## 2024-07-31 RX ADMIN — CEFAZOLIN 2 G: 330 INJECTION, POWDER, FOR SOLUTION INTRAMUSCULAR; INTRAVENOUS at 01:07

## 2024-07-31 NOTE — ANESTHESIA PREPROCEDURE EVALUATION
07/31/2024  Meaghan Call is a 63 y.o., female.      Pre-op Assessment    I have reviewed the Patient Summary Reports.     I have reviewed the Nursing Notes. I have reviewed the NPO Status.   I have reviewed the Medications.     Review of Systems  Anesthesia Hx:             Denies Family Hx of Anesthesia complications.    Denies Personal Hx of Anesthesia complications.                    Cardiovascular:     Hypertension                                            Physical Exam  General: Well nourished    Airway:  Mallampati: II   Mouth Opening: Normal  TM Distance: Normal    Chest/Lungs:  Normal Respiratory Rate    Anesthesia Plan  Type of Anesthesia, risks & benefits discussed:    Anesthesia Type: Gen Natural Airway  Intra-op Monitoring Plan: Standard ASA Monitors  Post Op Pain Control Plan: multimodal analgesia  Induction:  IV  Informed Consent: Informed consent signed with the Patient and all parties understand the risks and agree with anesthesia plan.  All questions answered.   ASA Score: 2  Day of Surgery Review of History & Physical: H&P Update referred to the surgeon/provider.    Ready For Surgery From Anesthesia Perspective.   .

## 2024-07-31 NOTE — ANESTHESIA POSTPROCEDURE EVALUATION
Anesthesia Post Evaluation    Patient: Meaghan Call    Procedure(s) Performed: Procedure(s) (LRB):  CYSTOSCOPY, WITH BLADDER BIOPSY, WITH FULGURATION IF INDICATED (N/A)  EXAM UNDER ANESTHESIA, VAGINA (N/A)    Final Anesthesia Type: general      Patient location during evaluation: PACU  Patient participation: Yes- Able to Participate  Level of consciousness: awake and alert  Post-procedure vital signs: reviewed and stable  Pain management: adequate  Airway patency: patent    PONV status at discharge: No PONV  Anesthetic complications: no      Cardiovascular status: stable  Respiratory status: spontaneous ventilation  Hydration status: euvolemic  Follow-up not needed.            Vitals Value Taken Time   /61 07/31/24 1348   Temp 36 07/31/24 1354   Pulse 69 07/31/24 1353   Resp 12 07/31/24 1353   SpO2 100 % 07/31/24 1353   Vitals shown include unfiled device data.      No case tracking events are documented in the log.      Pain/Dilcia Score: Dilcia Score: 10 (7/31/2024  1:46 PM)

## 2024-07-31 NOTE — TRANSFER OF CARE
"Anesthesia Transfer of Care Note    Patient: Meaghan Call    Procedure(s) Performed: Procedure(s) (LRB):  CYSTOSCOPY, WITH BLADDER BIOPSY, WITH FULGURATION IF INDICATED (N/A)  EXAM UNDER ANESTHESIA, VAGINA (N/A)    Patient location: PACU    Anesthesia Type: general    Transport from OR: Transported from OR on 6-10 L/min O2 by face mask with adequate spontaneous ventilation    Post pain: adequate analgesia    Post assessment: no apparent anesthetic complications    Post vital signs: stable    Level of consciousness: awake and lethargic    Nausea/Vomiting: no nausea/vomiting    Complications: none    Transfer of care protocol was followed      Last vitals: Visit Vitals  BP (!) 179/84 (BP Location: Left arm, Patient Position: Lying)   Pulse 80   Temp 36.5 °C (97.7 °F) (Temporal)   Resp 16   Ht 5' 1" (1.549 m)   Wt 59 kg (130 lb)   SpO2 99%   Breastfeeding No   BMI 24.56 kg/m²     "

## 2024-08-01 ENCOUNTER — NURSE TRIAGE (OUTPATIENT)
Dept: ADMINISTRATIVE | Facility: CLINIC | Age: 64
End: 2024-08-01
Payer: COMMERCIAL

## 2024-08-01 NOTE — TELEPHONE ENCOUNTER
Pt called in stating she had a cystoscope yesterday. Today has low back pain. Right side pain worse than Left. States had lumbar surgery few years. Rates pain 10/10. Taking Ibuprofen and heating pad no relief. Heating pad. Pain worse with movement. When standing, pain radiates down right leg. Wants to know if the position she was put in yesterday for procedure could be causing the back pain she is having. Care advice per protocol. Advised will route to provider asking for outreach to discuss concerns and to provide care directives. Advised to monitor and to call back with concerns or worsening symptoms. Verbalized understanding.     Reason for Disposition   SEVERE post-op pain (e.g., excruciating, pain scale 8-10) that is not controlled with pain medications    Additional Information   Negative: Sounds like a life-threatening emergency to the triager   Negative: Bright red, wide-spread, sunburn-like rash   Negative: SEVERE headache and after spinal (epidural) anesthesia   Negative: Vomiting and persists > 4 hours   Negative: Vomiting and abdomen looks much more swollen than usual   Negative: Drinking very little and dehydration suspected (e.g., no urine > 12 hours, very dry mouth, very lightheaded)   Negative: Patient sounds very sick or weak to the triager   Negative: Sounds like a serious complication to the triager   Negative: Fever > 100.4 F (38.0 C)   Negative: Caller has URGENT question and triager unable to answer question    Protocols used: Post-Op Symptoms and [id number]     No

## 2024-08-02 ENCOUNTER — TELEPHONE (OUTPATIENT)
Dept: UROLOGY | Facility: CLINIC | Age: 64
End: 2024-08-02
Payer: COMMERCIAL

## 2024-08-02 ENCOUNTER — PATIENT MESSAGE (OUTPATIENT)
Dept: UROLOGY | Facility: CLINIC | Age: 64
End: 2024-08-02
Payer: COMMERCIAL

## 2024-08-02 DIAGNOSIS — M54.50 LOW BACK PAIN WITHOUT SCIATICA, UNSPECIFIED BACK PAIN LATERALITY, UNSPECIFIED CHRONICITY: Primary | ICD-10-CM

## 2024-08-05 RX ORDER — IBUPROFEN 800 MG/1
800 TABLET ORAL 3 TIMES DAILY
Qty: 15 TABLET | Refills: 0 | Status: SHIPPED | OUTPATIENT
Start: 2024-08-05

## 2024-08-05 NOTE — TELEPHONE ENCOUNTER
----- Message from Angelica Urbina sent at 8/5/2024  8:19 AM CDT -----  Regarding: appt access  Contact: [phone]  Pt calling to schedule a follow up visit. Pls call

## 2024-08-12 ENCOUNTER — OFFICE VISIT (OUTPATIENT)
Dept: UROLOGY | Facility: CLINIC | Age: 64
End: 2024-08-12
Payer: COMMERCIAL

## 2024-08-12 VITALS
DIASTOLIC BLOOD PRESSURE: 81 MMHG | BODY MASS INDEX: 24.41 KG/M2 | HEART RATE: 65 BPM | SYSTOLIC BLOOD PRESSURE: 131 MMHG | WEIGHT: 129.19 LBS

## 2024-08-12 DIAGNOSIS — Z98.890 POST-OPERATIVE STATE: Primary | ICD-10-CM

## 2024-08-12 PROCEDURE — 99024 POSTOP FOLLOW-UP VISIT: CPT | Mod: S$GLB,,, | Performed by: UROLOGY

## 2024-08-12 PROCEDURE — 51798 US URINE CAPACITY MEASURE: CPT | Mod: S$GLB,,, | Performed by: UROLOGY

## 2024-08-12 PROCEDURE — 99999 PR PBB SHADOW E&M-EST. PATIENT-LVL III: CPT | Mod: PBBFAC,,, | Performed by: UROLOGY

## 2024-08-12 PROCEDURE — 3079F DIAST BP 80-89 MM HG: CPT | Mod: CPTII,S$GLB,, | Performed by: UROLOGY

## 2024-08-12 PROCEDURE — 3075F SYST BP GE 130 - 139MM HG: CPT | Mod: CPTII,S$GLB,, | Performed by: UROLOGY

## 2024-08-12 PROCEDURE — 1159F MED LIST DOCD IN RCRD: CPT | Mod: CPTII,S$GLB,, | Performed by: UROLOGY

## 2024-08-12 PROCEDURE — 81002 URINALYSIS NONAUTO W/O SCOPE: CPT | Mod: S$GLB,,, | Performed by: UROLOGY

## 2024-08-12 PROCEDURE — 4010F ACE/ARB THERAPY RXD/TAKEN: CPT | Mod: CPTII,S$GLB,, | Performed by: UROLOGY

## 2024-08-12 NOTE — PROGRESS NOTES
CHIEF COMPLAINT:    Mrs. Call is a 63 y.o. female presenting for a post op visit, following cystoscopy under sedation  on 7/31/2024.    PRESENTING ILLNESS:    Meaghan Call is a 63 y.o. female who returns for follow up.  She states that the back pain resolved.  It was like a band across the lumbar area where she had her surgery.  She did not notice the pain in recovery but the following morning she bent to reach something while in bed and felt the pain across the back.  She used lidocaine patches and the 800 mg ibuprofen. It has resolved.  She states she was tight and had not been doing her stretching exercises before her surgery.  She states she will commit to stretching every morning since it makes a difference in how she feels.     Discussed that there were no lesions on the cystoscopy.  She has a small cystocele.  She states that she sometimes is aware that something is at the introitus but it goes right back up.  She has been doing Kegel exercises.  This also helps.       Past Surgical History:   Procedure Laterality Date    BREAST BIOPSY Left     BENIGN    CHOLECYSTECTOMY      CYSTOSCOPY      CYSTOSCOPY WITH BIOPSY OF BLADDER N/A 7/31/2024    Procedure: CYSTOSCOPY, WITH BLADDER BIOPSY, WITH FULGURATION IF INDICATED;  Surgeon: Ebonie Booker MD;  Location: [facility] OR;  Service: Urology;  Laterality: N/A;  30 minutes    HYSTERECTOMY      lower lumbar surgery N/A 08/03/2021    MUSCLE BIPOSY Right     Thigh area    OOPHORECTOMY      TUBAL LIGATION      VAGINA EXAMINATION UNDER ANESTHESIA N/A 7/31/2024    Procedure: EXAM UNDER ANESTHESIA, VAGINA;  Surgeon: Ebonie Booker MD;  Location: [facility] OR;  Service: Urology;  Laterality: N/A;       Allergies:  Codeine    Medications:  Outpatient Encounter Medications as of 8/12/2024   Medication Sig Dispense Refill    amitriptyline (ELAVIL) 10 MG tablet TAKE 1 TABLET BY MOUTH EVERY DAY IN THE EVENING 90 tablet 3    amLODIPine (NORVASC) 2.5 MG tablet Take  2.5 mg by mouth once daily.      CALCIUM CARBONATE (CALCIUM 500 ORAL) Take by mouth.      estradioL (ESTRACE) 2 MG tablet Take 2 mg by mouth once daily.      famotidine (PEPCID) 40 MG tablet Take 40 mg by mouth once daily.      ibuprofen (ADVIL,MOTRIN) 800 MG tablet Take 1 tablet (800 mg total) by mouth 3 (three) times daily. 15 tablet 0    propranolol (INDERAL) 10 MG tablet Take 10 mg by mouth every morning.       sucralfate (CARAFATE) 1 gram tablet Take 1 g by mouth 2 (two) times daily.      valsartan (DIOVAN) 160 MG tablet Take 160 mg by mouth once daily.      valsartan-hydrochlorothiazide (DIOVAN-HCT) 160-12.5 mg per tablet valsartan 160 mg-hydrochlorothiazide 12.5 mg tablet   TAKE 1 TABLET BY MOUTH EVERY DAY       No facility-administered encounter medications on file as of 8/12/2024.         PHYSICAL EXAMINATION:    The patient generally appears in good health, is appropriately interactive, and is in no apparent distress.    Skin: No lesions.    Mental: Cooperative with normal affect.    Neuro: Grossly intact.    HEENT: Normal. No evidence of lymphadenopathy.    Chest:  normal inspiratory effort.    Abdomen:  Soft, non-tender. No masses or organomegaly. Bladder is not palpable. No evidence of flank discomfort. No evidence of inguinal hernia.    Extremities: No clubbing, cyanosis, or edema    Back:  no pain presently.  She has a small scar at the level of L4L5.      PVR by bladder scan was 0 ml      LABS:    UA 1.000, pH 6, 50 blood, otherwise, negative    IMPRESSION:    Post op state  Interstitial cystitis    PLAN:    1.  If she needs another cystoscopy in the future, plan to ask anesthesia to keep her awake enough to transfer herself and put her in stirrups before giving her sedation.   2.  Discussed that she had no lesions in the bladder. She has a small cystocele that it not consistently symptomatic.  She has a bulge that sticks out occasionally but is not a consistent finding.  Encouraged her to do Kegel  exercises.   3.  Follow up in 6 months.

## 2024-08-17 DIAGNOSIS — N30.10 INTERSTITIAL CYSTITIS: ICD-10-CM

## 2024-08-21 RX ORDER — AMITRIPTYLINE HYDROCHLORIDE 10 MG/1
10 TABLET, FILM COATED ORAL NIGHTLY
Qty: 90 TABLET | Refills: 3 | Status: SHIPPED | OUTPATIENT
Start: 2024-08-21

## 2025-08-01 DIAGNOSIS — N30.10 INTERSTITIAL CYSTITIS: ICD-10-CM

## 2025-08-01 RX ORDER — AMITRIPTYLINE HYDROCHLORIDE 10 MG/1
10 TABLET, FILM COATED ORAL NIGHTLY
Qty: 90 TABLET | Refills: 0 | Status: SHIPPED | OUTPATIENT
Start: 2025-08-01

## (undated) DEVICE — PACK CYSTO

## (undated) DEVICE — ELECTRODE REM PLYHSV RETURN 9

## (undated) DEVICE — SOL IRR WATER STRL 3000 ML

## (undated) DEVICE — TRAY CYSTO BASIN

## (undated) DEVICE — SYR 10CC LUER LOCK

## (undated) DEVICE — SET CYSTO IRRIGATION UNIV SPIK

## (undated) DEVICE — BAG URO DRAIN

## (undated) DEVICE — TRAY CYSTO BASIN OMC

## (undated) DEVICE — GLOVE BIOGEL PI MICRO INDIC 6

## (undated) DEVICE — PACK CYSTOSCOPY III SIRUS

## (undated) DEVICE — CUP MEDICINE GRADUATED 1OZ

## (undated) DEVICE — TUBING SUC UNIV W/CONN 12FT

## (undated) DEVICE — GOWN SURGICAL X-LARGE